# Patient Record
Sex: FEMALE | Race: WHITE | NOT HISPANIC OR LATINO | URBAN - METROPOLITAN AREA
[De-identification: names, ages, dates, MRNs, and addresses within clinical notes are randomized per-mention and may not be internally consistent; named-entity substitution may affect disease eponyms.]

---

## 2023-05-11 ENCOUNTER — TELEPHONE (OUTPATIENT)
Dept: PRIMARY CARE | Facility: CLINIC | Age: 73
End: 2023-05-11
Payer: MEDICARE

## 2023-05-11 NOTE — TELEPHONE ENCOUNTER
Called patient and left message that Dr. Acharya was looking at her schedule for tomorrow and she thinks that due to her slurred speech and difficulty speaking it is best the patient is evaluated ASAP at the emergency room rather than being seen in office.

## 2023-05-12 ENCOUNTER — OFFICE VISIT (OUTPATIENT)
Dept: PRIMARY CARE | Facility: CLINIC | Age: 73
End: 2023-05-12
Payer: MEDICARE

## 2023-05-12 VITALS
WEIGHT: 272.4 LBS | SYSTOLIC BLOOD PRESSURE: 142 MMHG | RESPIRATION RATE: 17 BRPM | HEART RATE: 89 BPM | OXYGEN SATURATION: 91 % | BODY MASS INDEX: 43.97 KG/M2 | DIASTOLIC BLOOD PRESSURE: 61 MMHG | TEMPERATURE: 97.7 F

## 2023-05-12 DIAGNOSIS — I51.89 DIASTOLIC DYSFUNCTION WITHOUT HEART FAILURE: ICD-10-CM

## 2023-05-12 DIAGNOSIS — I10 ESSENTIAL HYPERTENSION: Primary | ICD-10-CM

## 2023-05-12 DIAGNOSIS — E55.9 VITAMIN D DEFICIENCY: ICD-10-CM

## 2023-05-12 DIAGNOSIS — E66.01 MORBID OBESITY (MULTI): ICD-10-CM

## 2023-05-12 DIAGNOSIS — I47.11 INAPPROPRIATE SINUS NODE TACHYCARDIA (CMS-HCC): ICD-10-CM

## 2023-05-12 DIAGNOSIS — M15.9 PRIMARY OSTEOARTHRITIS INVOLVING MULTIPLE JOINTS: ICD-10-CM

## 2023-05-12 DIAGNOSIS — E11.9 CONTROLLED TYPE 2 DIABETES MELLITUS WITHOUT COMPLICATION, WITHOUT LONG-TERM CURRENT USE OF INSULIN (MULTI): ICD-10-CM

## 2023-05-12 DIAGNOSIS — Z11.59 ENCOUNTER FOR HEPATITIS C SCREENING TEST FOR LOW RISK PATIENT: ICD-10-CM

## 2023-05-12 DIAGNOSIS — E78.2 MIXED HYPERLIPIDEMIA: ICD-10-CM

## 2023-05-12 DIAGNOSIS — G89.4 CHRONIC PAIN SYNDROME: ICD-10-CM

## 2023-05-12 DIAGNOSIS — Z12.31 BREAST CANCER SCREENING BY MAMMOGRAM: ICD-10-CM

## 2023-05-12 DIAGNOSIS — F41.8 DEPRESSION WITH ANXIETY: ICD-10-CM

## 2023-05-12 DIAGNOSIS — Z86.718 PERSONAL HISTORY OF OTHER VENOUS THROMBOSIS AND EMBOLISM: ICD-10-CM

## 2023-05-12 DIAGNOSIS — J45.20 MILD INTERMITTENT ASTHMA WITHOUT COMPLICATION (HHS-HCC): ICD-10-CM

## 2023-05-12 DIAGNOSIS — R00.0 TACHYCARDIA: ICD-10-CM

## 2023-05-12 PROBLEM — M19.90 OSTEOARTHRITIS: Status: ACTIVE | Noted: 2023-05-12

## 2023-05-12 PROBLEM — R32 URINARY INCONTINENCE: Status: RESOLVED | Noted: 2023-05-12 | Resolved: 2023-05-12

## 2023-05-12 PROBLEM — M54.50 LOW BACK PAIN: Status: ACTIVE | Noted: 2023-05-12

## 2023-05-12 PROBLEM — J45.909 ASTHMA (HHS-HCC): Status: ACTIVE | Noted: 2023-05-12

## 2023-05-12 PROBLEM — H25.11 AGE-RELATED NUCLEAR CATARACT OF RIGHT EYE: Status: RESOLVED | Noted: 2019-08-05 | Resolved: 2023-05-12

## 2023-05-12 PROBLEM — G62.9 PERIPHERAL NEUROPATHY: Status: ACTIVE | Noted: 2023-05-12

## 2023-05-12 PROBLEM — L50.3 DERMATOGRAPHISM: Status: ACTIVE | Noted: 2017-04-27

## 2023-05-12 PROBLEM — E78.5 HYPERLIPIDEMIA: Status: ACTIVE | Noted: 2017-04-27

## 2023-05-12 PROBLEM — M25.361 KNEE INSTABILITY, RIGHT: Status: ACTIVE | Noted: 2023-05-12

## 2023-05-12 PROBLEM — R32 URINARY INCONTINENCE: Status: ACTIVE | Noted: 2023-05-12

## 2023-05-12 PROCEDURE — 4010F ACE/ARB THERAPY RXD/TAKEN: CPT | Performed by: FAMILY MEDICINE

## 2023-05-12 PROCEDURE — 3078F DIAST BP <80 MM HG: CPT | Performed by: FAMILY MEDICINE

## 2023-05-12 PROCEDURE — 99204 OFFICE O/P NEW MOD 45 MIN: CPT | Performed by: FAMILY MEDICINE

## 2023-05-12 PROCEDURE — 1159F MED LIST DOCD IN RCRD: CPT | Performed by: FAMILY MEDICINE

## 2023-05-12 PROCEDURE — 1170F FXNL STATUS ASSESSED: CPT | Performed by: FAMILY MEDICINE

## 2023-05-12 PROCEDURE — 3077F SYST BP >= 140 MM HG: CPT | Performed by: FAMILY MEDICINE

## 2023-05-12 RX ORDER — MONTELUKAST SODIUM 10 MG/1
10 TABLET ORAL DAILY
COMMUNITY

## 2023-05-12 RX ORDER — APIXABAN 5 MG/1
5 TABLET, FILM COATED ORAL DAILY
COMMUNITY
Start: 2023-05-03

## 2023-05-12 RX ORDER — ALBUTEROL SULFATE 0.83 MG/ML
2.5 SOLUTION RESPIRATORY (INHALATION) 4 TIMES DAILY
COMMUNITY
Start: 2023-03-22 | End: 2023-05-12 | Stop reason: ALTCHOICE

## 2023-05-12 RX ORDER — ALBUTEROL SULFATE 90 UG/1
2 AEROSOL, METERED RESPIRATORY (INHALATION) EVERY 4 HOURS PRN
COMMUNITY

## 2023-05-12 RX ORDER — DULOXETIN HYDROCHLORIDE 60 MG/1
60 CAPSULE, DELAYED RELEASE ORAL DAILY
COMMUNITY
Start: 2023-05-01

## 2023-05-12 RX ORDER — METOPROLOL SUCCINATE 25 MG/1
25 TABLET, EXTENDED RELEASE ORAL DAILY
COMMUNITY
Start: 2023-03-09

## 2023-05-12 RX ORDER — OXYBUTYNIN CHLORIDE 10 MG/1
TABLET, EXTENDED RELEASE ORAL
COMMUNITY
Start: 2023-03-26

## 2023-05-12 RX ORDER — TRAMADOL HYDROCHLORIDE 50 MG/1
50 TABLET ORAL 2 TIMES DAILY
COMMUNITY
Start: 2023-05-04

## 2023-05-12 RX ORDER — ERGOCALCIFEROL 1.25 MG/1
1.25 CAPSULE ORAL
COMMUNITY
Start: 2023-03-19

## 2023-05-12 RX ORDER — LOSARTAN POTASSIUM 25 MG/1
25 TABLET ORAL 2 TIMES DAILY
COMMUNITY
Start: 2023-05-01

## 2023-05-12 RX ORDER — CETIRIZINE HYDROCHLORIDE 10 MG/1
10 TABLET ORAL
COMMUNITY
Start: 2017-09-06

## 2023-05-12 ASSESSMENT — ENCOUNTER SYMPTOMS
NERVOUS/ANXIOUS: 0
TROUBLE SWALLOWING: 0
COUGH: 0
UNEXPECTED WEIGHT CHANGE: 0
FATIGUE: 0
MYALGIAS: 0
CONSTIPATION: 0
DIZZINESS: 0
DIARRHEA: 0
APPETITE CHANGE: 0
POLYDIPSIA: 0
POLYPHAGIA: 0
PALPITATIONS: 0
SHORTNESS OF BREATH: 0
LIGHT-HEADEDNESS: 0
ARTHRALGIAS: 1
NAUSEA: 0
BRUISES/BLEEDS EASILY: 0
LOSS OF SENSATION IN FEET: 1
DEPRESSION: 1
HEADACHES: 0
OCCASIONAL FEELINGS OF UNSTEADINESS: 1
DYSPHORIC MOOD: 0

## 2023-05-12 ASSESSMENT — ACTIVITIES OF DAILY LIVING (ADL)
JUDGMENT_ADEQUATE_SAFELY_COMPLETE_DAILY_ACTIVITIES: YES
DRESSING YOURSELF: NEEDS ASSISTANCE
HEARING - LEFT EAR: DIFFICULTY WITH NOISE
ADEQUATE_TO_COMPLETE_ADL: YES
ASSISTIVE_DEVICE: WALKER
PATIENT'S MEMORY ADEQUATE TO SAFELY COMPLETE DAILY ACTIVITIES?: YES
BATHING: NEEDS ASSISTANCE
WALKS IN HOME: DEPENDENT
GROOMING: NEEDS ASSISTANCE
FEEDING YOURSELF: INDEPENDENT
HEARING - RIGHT EAR: DIFFICULTY WITH NOISE
TOILETING: INDEPENDENT

## 2023-05-12 ASSESSMENT — PATIENT HEALTH QUESTIONNAIRE - PHQ9
8. MOVING OR SPEAKING SO SLOWLY THAT OTHER PEOPLE COULD HAVE NOTICED. OR THE OPPOSITE, BEING SO FIGETY OR RESTLESS THAT YOU HAVE BEEN MOVING AROUND A LOT MORE THAN USUAL: NEARLY EVERY DAY
SUM OF ALL RESPONSES TO PHQ9 QUESTIONS 1 AND 2: 3
1. LITTLE INTEREST OR PLEASURE IN DOING THINGS: NOT AT ALL
4. FEELING TIRED OR HAVING LITTLE ENERGY: NOT AT ALL
6. FEELING BAD ABOUT YOURSELF - OR THAT YOU ARE A FAILURE OR HAVE LET YOURSELF OR YOUR FAMILY DOWN: NOT AT ALL
3. TROUBLE FALLING OR STAYING ASLEEP OR SLEEPING TOO MUCH: NEARLY EVERY DAY
5. POOR APPETITE OR OVEREATING: NOT AT ALL
SUM OF ALL RESPONSES TO PHQ QUESTIONS 1-9: 12
9. THOUGHTS THAT YOU WOULD BE BETTER OFF DEAD, OR OF HURTING YOURSELF: NOT AT ALL
10. IF YOU CHECKED OFF ANY PROBLEMS, HOW DIFFICULT HAVE THESE PROBLEMS MADE IT FOR YOU TO DO YOUR WORK, TAKE CARE OF THINGS AT HOME, OR GET ALONG WITH OTHER PEOPLE: NOT DIFFICULT AT ALL
2. FEELING DOWN, DEPRESSED OR HOPELESS: NEARLY EVERY DAY
7. TROUBLE CONCENTRATING ON THINGS, SUCH AS READING THE NEWSPAPER OR WATCHING TELEVISION: NEARLY EVERY DAY

## 2023-05-12 ASSESSMENT — COLUMBIA-SUICIDE SEVERITY RATING SCALE - C-SSRS
1. IN THE PAST MONTH, HAVE YOU WISHED YOU WERE DEAD OR WISHED YOU COULD GO TO SLEEP AND NOT WAKE UP?: NO
2. HAVE YOU ACTUALLY HAD ANY THOUGHTS OF KILLING YOURSELF?: NO
6. HAVE YOU EVER DONE ANYTHING, STARTED TO DO ANYTHING, OR PREPARED TO DO ANYTHING TO END YOUR LIFE?: NO

## 2023-05-12 NOTE — ASSESSMENT & PLAN NOTE
DVT was around one year ago. RLE. Below knee per patient. NO records from [facility] in Care Everywhere. Expalined that usually do not continue Eliquis indefinitely. Going back to see Dr. Mazariegos so will let her decide when to stop.

## 2023-05-12 NOTE — ASSESSMENT & PLAN NOTE
Is currently not on controller. Denies exacerbation but gets winded when walks fast. She is on Singulair and prn albuterol.

## 2023-05-12 NOTE — ASSESSMENT & PLAN NOTE
Obesity contributes. She is losing weight. On chronic Tramadol. She has been getting from Dr. Tripp in [place].

## 2023-05-12 NOTE — PATIENT INSTRUCTIONS
You have referral to Dr. Mazariegos  You have referral to [facility] in [place].     Have labs drawn for chronic conditions and follow up in 2 weeks.   Bring your pill bottles with you to visit.

## 2023-05-12 NOTE — PROGRESS NOTES
Subjective   Patient ID: Mary Dao is a 73 y.o. female who presents for [facility] (Patient states that her family thinks that she may have had a stroke do to slurring of speech uneven smile. ).    New patient    Came in to see if she had a stroke. Her smile was off. Right side is droopy. She has no clue. She has had a lisp and not sure if started when got new teeth last fall.   Not sure how long her smile has been off. Asked her son about it 2 yrs ago. Her only difficulty speaking is a lisp for the last couple years. She has no difficulty finding words or saying them.     Diastolic dysfunction - Dr. Mazariegos - has not seen her in 3 yrs. She has been busy caring for [facility] with MS. Florian to see her again.     HTN - takes medication for high blood pressure. No heart attack. No chest pain. Only gets chest pain when gets winded from walking too fast. This has been going on for 10 yrs. IS not different than in the past. She thinks was checked out by Dr. Mazariegos.     Asthma - only on Albuterol. Gets symptoms when gets sick per patient. Also if pollen high. Is on Singulair 10 mg daily. On Zyrtec 10 mg daily    Hyperlipidemia - she lost 110 lbs and has not been on medication. Lost the weight overr the past 4 yrs.     Tachycardia has resolved.     Incontinence is better.     She takes Tramadol for knee pain, hip pain, ankle pain for her arthritis. She went to arthritis doctor and told her is degenerative. Tramadol is prescribed by Dr. Tripp at [facility]. She used them while with her  in assisted living. Did go to pain management clinic in [place].     Vitamin D deficiency - on Vitamin D 2000 units daily.     Allergies - On Zyrtec daily. She Is going to resume Flonase.     DM2 - A1Cs in 2020 were in diabetes range. She has had sugars checked and are due in August. Last A1C was 5.9 per patient.. She stopped all her sweets to lose the weight.  A1 Cs been 6.8 then 6.5 then 5.9. The 6.5 was drawn at  [place]. Not sure what lab it was sent to.     Had DVT in calf 1 year ago. No clots in lungs. Clot was diagnosed at [facility].                        Current Outpatient Medications:     albuterol 90 mcg/actuation inhaler, Inhale 2 puffs every 4 hours if needed., Disp: , Rfl:     cetirizine (ZyrTEC) 10 mg tablet, Take 1 tablet (10 mg) by mouth., Disp: , Rfl:     DULoxetine (Cymbalta) 60 mg DR capsule, Take 1 capsule (60 mg) by mouth once daily., Disp: , Rfl:     Eliquis 5 mg tablet, Take 1 tablet (5 mg) by mouth once daily., Disp: , Rfl:     ergocalciferol (Vitamin D-2) 1.25 MG (67289 UT) capsule, Take 1 capsule (1,250 mcg) by mouth 1 (one) time per week., Disp: , Rfl:     losartan (Cozaar) 25 mg tablet, Take 1 tablet (25 mg) by mouth 2 times a day., Disp: , Rfl:     metoprolol succinate XL (Toprol-XL) 25 mg 24 hr tablet, Take 1 tablet (25 mg) by mouth once daily., Disp: , Rfl:     montelukast (Singulair) 10 mg tablet, Take 1 tablet (10 mg) by mouth once daily., Disp: , Rfl:     oxybutynin XL (Ditropan-XL) 10 mg 24 hr tablet, TAKE 1 TABLET BY MOUTH EVERY DAY FOR STRESS INCONTINENCE, Disp: , Rfl:     traMADol (Ultram) 50 mg tablet, Take 1 tablet (50 mg) by mouth 2 times a day., Disp: , Rfl:     Patient Active Problem List   Diagnosis    Personal history of other venous thrombosis and embolism    Peripheral neuropathy    Low back pain    Knee instability, right    Inappropriate sinus node tachycardia    Hyperlipidemia    Essential hypertension    Diastolic dysfunction without heart failure    Dermatographism    Depression with anxiety    Asthma    Morbid obesity (CMS/HCC)    Primary osteoarthritis involving multiple joints    Sensorineural hearing loss, bilateral         Review of Systems   Constitutional:  Negative for appetite change, fatigue and unexpected weight change.   HENT:  Negative for trouble swallowing.    Eyes:  Negative for visual disturbance.   Respiratory:  Negative for cough and shortness of breath.     Cardiovascular:  Negative for chest pain, palpitations and leg swelling.   Gastrointestinal:  Negative for constipation, diarrhea and nausea.   Endocrine: Negative for cold intolerance, heat intolerance, polydipsia, polyphagia and polyuria.   Musculoskeletal:  Positive for arthralgias. Negative for myalgias.   Skin:  Negative for rash.   Neurological:  Negative for dizziness, light-headedness and headaches.   Hematological:  Does not bruise/bleed easily.   Psychiatric/Behavioral:  Negative for dysphoric mood. The patient is not nervous/anxious.           not long ago. She has not been feeling badly outside of normal grief. Depression has been controlled with Duloxetine.        Objective   /61 (BP Location: Left arm, Patient Position: Sitting, BP Cuff Size: Large adult)   Pulse 89   Temp 36.5 °C (97.7 °F)   Resp 17   Wt 124 kg (272 lb 6.4 oz)   SpO2 91%   BMI 43.97 kg/m²     Physical Exam  Vitals reviewed.   Constitutional:       General: She is not in acute distress.     Appearance: She is not ill-appearing.   HENT:      Head: Normocephalic and atraumatic.      Nose: Nose normal.      Mouth/Throat:      Mouth: Mucous membranes are dry.      Pharynx: Oropharynx is clear.      Comments: Mild lisp. NO dysarthria.   Eyes:      Extraocular Movements: Extraocular movements intact.      Conjunctiva/sclera: Conjunctivae normal.      Pupils: Pupils are equal, round, and reactive to light.   Neck:      Vascular: No carotid bruit.      Comments: No thryomegaly. No bruit.   Cardiovascular:      Rate and Rhythm: Normal rate and regular rhythm.      Pulses: Normal pulses.      Heart sounds: No murmur heard.  Pulmonary:      Effort: Pulmonary effort is normal.      Breath sounds: Normal breath sounds.   Musculoskeletal:         General: Normal range of motion.      Cervical back: Normal range of motion and neck supple.      Right lower leg: No edema.      Left lower leg: No edema.   Skin:     General: Skin  is warm and dry.      Findings: No rash.   Neurological:      General: No focal deficit present.      Mental Status: She is alert and oriented to person, place, and time. Mental status is at baseline.      Cranial Nerves: No cranial nerve deficit.      Sensory: No sensory deficit.      Motor: No weakness.      Coordination: Coordination normal.      Comments: Hard of hearing   Psychiatric:         Mood and Affect: Mood normal.         Assessment/Plan   Problem List Items Addressed This Visit          Respiratory    Asthma     Is currently not on controller. Denies exacerbation but gets winded when walks fast. She is on Singulair and prn albuterol.             Circulatory    Inappropriate sinus node tachycardia     Controlled on Metoprolol. Needs to get back in to see Dr. Mazariegos.          Relevant Medications    metoprolol succinate XL (Toprol-XL) 25 mg 24 hr tablet    Essential hypertension - Primary     /61 currently. Discussed goal less than 140. She is on Losartan currently. Follow up in 2 months, lab prior.          Diastolic dysfunction without heart failure       Musculoskeletal    Primary osteoarthritis involving multiple joints     Obesity contributes. She is losing weight. On chronic Tramadol. She has been getting from Dr. Tripp in [place].             Endocrine/Metabolic    Morbid obesity (CMS/HCC)     Has lost a lot of weight and working on diet. Little exercise.             Other    Personal history of other venous thrombosis and embolism     DVT was around one year ago. RLE. Below knee per patient. NO records from [facility] in Care Everywhere. Expalined that usually do not continue Eliquis indefinitely. Going back to see Dr. Mazariegos so will let her decide when to stop.          Hyperlipidemia    Depression with anxiety     Stable on Duloxetine.  diet 2 weeks ago of MS.           Other Visit Diagnoses       Tachycardia                  Assessment, plans, tests, and follow up  discussed with patient and patient verbalized understanding. Mary was given an opportunity to ask questions and  any concerns were addressed including but not limited to need for labs and follow up Need to go to Pain management for pain meds. .

## 2023-05-12 NOTE — ASSESSMENT & PLAN NOTE
/61 currently. Discussed goal less than 140. She is on Losartan currently. Follow up in 2 months, lab prior.

## 2023-05-12 NOTE — ASSESSMENT & PLAN NOTE
Patient referred to pain clinic in [place]. States she was seen there remotely. She agreed to go there.

## 2024-09-24 DIAGNOSIS — E55.9 VITAMIN D DEFICIENCY, UNSPECIFIED: ICD-10-CM

## 2024-09-24 DIAGNOSIS — E78.5 HYPERLIPIDEMIA, UNSPECIFIED: ICD-10-CM

## 2024-09-24 DIAGNOSIS — E11.9 TYPE 2 DIABETES MELLITUS WITHOUT COMPLICATIONS (MULTI): Primary | ICD-10-CM

## 2024-09-24 DIAGNOSIS — I10 ESSENTIAL (PRIMARY) HYPERTENSION: ICD-10-CM

## 2024-09-24 DIAGNOSIS — E46 UNSPECIFIED PROTEIN-CALORIE MALNUTRITION (MULTI): ICD-10-CM

## 2024-10-28 RX ORDER — SEMAGLUTIDE 0.68 MG/ML
0.25 INJECTION, SOLUTION SUBCUTANEOUS WEEKLY
COMMUNITY
Start: 2024-09-20

## 2024-10-28 RX ORDER — LOSARTAN POTASSIUM 50 MG/1
1 TABLET ORAL 2 TIMES DAILY
COMMUNITY
Start: 2024-07-16

## 2024-10-28 RX ORDER — FAMOTIDINE 20 MG/1
20 TABLET, FILM COATED ORAL 2 TIMES DAILY
COMMUNITY
Start: 2024-04-22

## 2024-10-28 RX ORDER — BACLOFEN 10 MG/1
10 TABLET ORAL DAILY
COMMUNITY
Start: 2024-07-01

## 2024-11-13 ENCOUNTER — OFFICE VISIT (OUTPATIENT)
Dept: CARDIOLOGY | Facility: HOSPITAL | Age: 74
End: 2024-11-13
Payer: MEDICARE

## 2024-11-13 VITALS
BODY MASS INDEX: 44.87 KG/M2 | DIASTOLIC BLOOD PRESSURE: 60 MMHG | HEIGHT: 66 IN | HEART RATE: 77 BPM | WEIGHT: 279.2 LBS | SYSTOLIC BLOOD PRESSURE: 128 MMHG

## 2024-11-13 DIAGNOSIS — E78.2 MIXED HYPERLIPIDEMIA: ICD-10-CM

## 2024-11-13 DIAGNOSIS — I10 ESSENTIAL HYPERTENSION: Primary | ICD-10-CM

## 2024-11-13 DIAGNOSIS — R06.02 SHORTNESS OF BREATH: ICD-10-CM

## 2024-11-13 PROCEDURE — 3008F BODY MASS INDEX DOCD: CPT | Performed by: INTERNAL MEDICINE

## 2024-11-13 PROCEDURE — 99204 OFFICE O/P NEW MOD 45 MIN: CPT | Performed by: INTERNAL MEDICINE

## 2024-11-13 PROCEDURE — 3074F SYST BP LT 130 MM HG: CPT | Performed by: INTERNAL MEDICINE

## 2024-11-13 PROCEDURE — 3078F DIAST BP <80 MM HG: CPT | Performed by: INTERNAL MEDICINE

## 2024-11-13 PROCEDURE — 4010F ACE/ARB THERAPY RXD/TAKEN: CPT | Performed by: INTERNAL MEDICINE

## 2024-11-13 PROCEDURE — 93005 ELECTROCARDIOGRAM TRACING: CPT | Performed by: INTERNAL MEDICINE

## 2024-11-13 PROCEDURE — 1159F MED LIST DOCD IN RCRD: CPT | Performed by: INTERNAL MEDICINE

## 2024-11-13 PROCEDURE — 93010 ELECTROCARDIOGRAM REPORT: CPT | Performed by: INTERNAL MEDICINE

## 2024-11-13 PROCEDURE — 1160F RVW MEDS BY RX/DR IN RCRD: CPT | Performed by: INTERNAL MEDICINE

## 2024-11-13 PROCEDURE — 1036F TOBACCO NON-USER: CPT | Performed by: INTERNAL MEDICINE

## 2024-11-13 PROCEDURE — 99214 OFFICE O/P EST MOD 30 MIN: CPT | Performed by: INTERNAL MEDICINE

## 2024-11-13 RX ORDER — ATROPINE SULFATE 0.1 MG/ML
.25-5 INJECTION INTRAVENOUS ONCE AS NEEDED
OUTPATIENT
Start: 2024-11-13

## 2024-11-13 RX ORDER — BUDESONIDE AND FORMOTEROL FUMARATE DIHYDRATE 160; 4.5 UG/1; UG/1
2 AEROSOL RESPIRATORY (INHALATION)
COMMUNITY

## 2024-11-13 RX ORDER — DOBUTAMINE HYDROCHLORIDE 100 MG/100ML
5-40 INJECTION INTRAVENOUS CONTINUOUS
OUTPATIENT
Start: 2024-11-13

## 2024-11-13 ASSESSMENT — ENCOUNTER SYMPTOMS
OCCASIONAL FEELINGS OF UNSTEADINESS: 0
LOSS OF SENSATION IN FEET: 0
DEPRESSION: 1

## 2024-11-13 NOTE — PROGRESS NOTES
Chief Complaint:   New Patient Visit (est)     History Of Present Illness:    Mary Dao is a 74 y.o. female presenting to [facility].  He has a past medical history of hypertension, hyperlipidemia, diabetes, family history of coronary artery disease in mother at 72.    She is coming in for evaluation of shortness of breath.  She states that since she saw me last in 2020 she suffered from a DVT but did not have PE.  She is currently on Eliquis 2.5 twice a day.  She also has asthma that is well-controlled with current medications.  She had a neck surgery few months ago.  She states she is getting out of breath easily with minimal activities.  She does not have any symptoms at rest.  No chest pain.  She does not have ankle swelling, orthopnea or PND.  She is trying to gradually increase her exercise capacity.  She has lost 5 pounds since she started Ozempic.         See past medical history below-we diagnosed her with inappropriate sinus tachycardia.  She gets physical therapy because of arthritis and aches and pains. The physical therapist noted that her heart rate is at 117 bpm at rest and goes up 144 bpm sometimes. During exercise she sometimes lightheaded and requested further cardiac evaluation.  The symptoms improved after her blood pressure medicines were adjusted and blood pressure got under control.  However     Patient states that her symptoms are going on for last 2 years. Her heart rate is always high. She was evaluated at [facility] because of palpitations 2 years ago and was told that she has a stiff heart. She was advised to take no more than 8 ounces of fluid per day.     I reviewed echocardiogram done at [facility] on July 04, 2018 showing normal LV function and stage I diastolic dysfunction. She recently had a Holter monitor for 24 hours showing predominantly sinus rhythm with average heart rate of 10 4 bpm and maximum heart rate of 1 47 bpm, few PVCs seen.)            Last Recorded  "Vitals:  Vitals:    11/13/24 1203   BP: 128/60   BP Location: Left arm   Pulse: 77   Weight: 127 kg (279 lb 3.2 oz)   Height: 1.676 m (5' 6\")       Past Medical History:  She has a past medical history of Age-related nuclear cataract of right eye (08/05/2019), Other specified anxiety disorders (07/13/2018), Personal history of other diseases of the circulatory system (11/10/2017), Personal history of other diseases of the respiratory system (11/10/2017), Personal history of other specified conditions, Unspecified asthma, uncomplicated ([facility]) (12/11/2019), and Urinary incontinence (05/12/2023).    Past Surgical History:  She has no past surgical history on file.      Social History:  She reports that she has never smoked. She has never used smokeless tobacco. She reports that she does not drink alcohol and does not use drugs.    Family History:  No family history on file.     Allergies:  Codeine, Dextromethorphan, Fluticasone propion-salmeterol, Lidocaine, Morphine, Other, Penicillins, and Sulfa (sulfonamide antibiotics)    Outpatient Medications:  Current Outpatient Medications   Medication Instructions    albuterol 90 mcg/actuation inhaler 2 puffs, Every 4 hours PRN    baclofen (LIORESAL) 10 mg, Daily    budesonide-formoteroL (Symbicort) 160-4.5 mcg/actuation inhaler 2 puffs, inhalation, 2 times daily RT, Rinse mouth with water after use to reduce aftertaste and incidence of candidiasis. Do not swallow.    cetirizine (ZYRTEC) 10 mg    DULoxetine (CYMBALTA) 60 mg, Daily    Eliquis 2.5 mg, 2 times daily    ergocalciferol (VITAMIN D-2) 1.25 mg, Once Weekly    famotidine (PEPCID) 20 mg, 2 times daily    losartan (Cozaar) 50 mg tablet 1 tablet, oral, 2 times daily    losartan (COZAAR) 25 mg, 2 times daily    metoprolol succinate XL (TOPROL-XL) 25 mg, Daily    montelukast (SINGULAIR) 10 mg, Daily    oxybutynin XL (Ditropan-XL) 10 mg 24 hr tablet TAKE 1 TABLET BY MOUTH EVERY DAY FOR STRESS INCONTINENCE    Ozempic 0.25 " mg, Weekly    traMADol (ULTRAM) 50 mg, 2 times daily       Physical Exam:  Physical Exam  Vitals reviewed.   Constitutional:       Appearance: Normal appearance.   Neck:      Vascular: No carotid bruit or JVD.   Cardiovascular:      Rate and Rhythm: Normal rate and regular rhythm.      Pulses: Normal pulses.      Heart sounds: Normal heart sounds, S1 normal and S2 normal.   Pulmonary:      Effort: Pulmonary effort is normal. No respiratory distress.      Breath sounds: No wheezing or rales.   Abdominal:      General: Abdomen is flat.      Palpations: Abdomen is soft.   Musculoskeletal:      Right lower leg: No edema.      Left lower leg: No edema.   Skin:     General: Skin is warm.   Neurological:      Mental Status: She is alert and oriented to person, place, and time. Mental status is at baseline.   Psychiatric:         Mood and Affect: Mood normal.         Behavior: Behavior normal.           Last Labs:  CBC -  Lab Results   Component Value Date    WBC 8.6 07/15/2020    HGB 12.8 07/15/2020    HCT 39.5 07/15/2020    MCV 83 07/15/2020     07/15/2020       CMP -  Lab Results   Component Value Date    CALCIUM 8.7 07/29/2020    PROT 6.0 (L) 07/15/2020    ALBUMIN 3.5 07/15/2020    AST 15 07/15/2020    ALT 17 07/15/2020    ALKPHOS 86 07/15/2020    BILITOT 0.8 07/15/2020       LIPID PANEL -   Lab Results   Component Value Date    CHOL 175 07/15/2020    TRIG 112 07/15/2020    HDL 49.7 07/15/2020    CHHDL 3.5 07/15/2020    LDLF 103 (H) 07/15/2020    VLDL 22 07/15/2020       RENAL FUNCTION PANEL -   Lab Results   Component Value Date    GLUCOSE 158 (H) 07/29/2020     07/29/2020    K 4.0 07/29/2020     07/29/2020    CO2 29 07/29/2020    ANIONGAP 9 (L) 07/29/2020    BUN 10 07/29/2020    CREATININE 0.67 07/29/2020    CALCIUM 8.7 07/29/2020    ALBUMIN 3.5 07/15/2020        Lab Results   Component Value Date    BNP 20 11/13/2019    HGBA1C 6.4 (H) 04/03/2024       Last Cardiology Tests:  ECG:  No results found  "for this or any previous visit from the past 1095 days.      Echo:  No results found for this or any previous visit from the past 1095 days.      Ejection Fractions:  No results found for: \"EF\"    Cath:  No results found for this or any previous visit from the past 1095 days.      Stress Test:  No results found for this or any previous visit from the past 1095 days.      Cardiac Imaging:  No results found for this or any previous visit from the past 1095 days.          Assessment/Plan     In summary Ms. Dao is a 74-year-old lady with inappropriate sinus tachycardia.     1- IST- She has palpitations with heart rate at rest sometimes up to 144 bpm. Holter monitor did not show any arrhythmia.  She can continue physical therapy.  She is currently on a beta-blocker.    2- HTN- she has well-controlled hypertension.    3-shortness of breath-differential diagnosis deconditioning, versus cardiac.  Asthma seems well-controlled at this time and patient does not have history of PE she is chronically anticoagulated.    Prior history of diastolic dysfunction on echocardiogram.  Will repeat echo and arrange for a pharmacological stress MPI.  Patient is unable to exercise uses a walker to ambulate.    She has multiple cardiovascular risk factors including family history, history of diabetes.    4-hyperlipidemia associate with diabetes-would benefit from a statin we discussed but she declines at this time citing adverse reaction in father.       Shena Mazariegos MD  "

## 2024-11-27 ENCOUNTER — APPOINTMENT (OUTPATIENT)
Dept: CARDIOLOGY | Facility: HOSPITAL | Age: 74
End: 2024-11-27
Payer: MEDICARE

## 2024-12-13 ENCOUNTER — APPOINTMENT (OUTPATIENT)
Dept: CARDIOLOGY | Facility: HOSPITAL | Age: 74
End: 2024-12-13
Payer: MEDICARE

## 2024-12-16 ENCOUNTER — HOSPITAL ENCOUNTER (OUTPATIENT)
Dept: CARDIOLOGY | Facility: HOSPITAL | Age: 74
Discharge: HOME | End: 2024-12-16
Payer: MEDICARE

## 2024-12-16 DIAGNOSIS — I10 ESSENTIAL HYPERTENSION: ICD-10-CM

## 2024-12-16 DIAGNOSIS — R06.02 SHORTNESS OF BREATH: ICD-10-CM

## 2024-12-16 PROCEDURE — C8929 TTE W OR WO FOL WCON,DOPPLER: HCPCS | Mod: 59

## 2024-12-16 PROCEDURE — 93350 STRESS TTE ONLY: CPT | Performed by: INTERNAL MEDICINE

## 2024-12-16 PROCEDURE — 93016 CV STRESS TEST SUPVJ ONLY: CPT | Performed by: INTERNAL MEDICINE

## 2024-12-16 PROCEDURE — 93306 TTE W/DOPPLER COMPLETE: CPT | Performed by: INTERNAL MEDICINE

## 2024-12-16 PROCEDURE — 2500000004 HC RX 250 GENERAL PHARMACY W/ HCPCS (ALT 636 FOR OP/ED): Performed by: INTERNAL MEDICINE

## 2024-12-16 PROCEDURE — 93017 CV STRESS TEST TRACING ONLY: CPT

## 2024-12-16 PROCEDURE — 93018 CV STRESS TEST I&R ONLY: CPT | Performed by: INTERNAL MEDICINE

## 2024-12-16 RX ORDER — DOBUTAMINE HYDROCHLORIDE 100 MG/100ML
5-40 INJECTION INTRAVENOUS CONTINUOUS
Status: DISCONTINUED | OUTPATIENT
Start: 2024-12-16 | End: 2024-12-17 | Stop reason: HOSPADM

## 2024-12-16 RX ORDER — ATROPINE SULFATE 0.1 MG/ML
.25-5 INJECTION INTRAVENOUS ONCE AS NEEDED
Status: DISCONTINUED | OUTPATIENT
Start: 2024-12-16 | End: 2024-12-17 | Stop reason: HOSPADM

## 2024-12-16 RX ORDER — METOPROLOL TARTRATE 1 MG/ML
3 INJECTION, SOLUTION INTRAVENOUS ONCE
Status: COMPLETED | OUTPATIENT
Start: 2024-12-16 | End: 2024-12-16

## 2024-12-17 LAB
AORTIC VALVE MEAN GRADIENT: 8 MMHG
AORTIC VALVE PEAK VELOCITY: 1.98 M/S
AV PEAK GRADIENT: 16 MMHG
AVA (PEAK VEL): 1.89 CM2
AVA (VTI): 1.88 CM2
EJECTION FRACTION APICAL 4 CHAMBER: 56.8
EJECTION FRACTION: 59 %
LEFT ATRIUM VOLUME AREA LENGTH INDEX BSA: 10.6 ML/M2
LEFT VENTRICLE INTERNAL DIMENSION DIASTOLE: 4.46 CM (ref 3.5–6)
LEFT VENTRICULAR OUTFLOW TRACT DIAMETER: 2.1 CM
LV EJECTION FRACTION BIPLANE: 59 %
MITRAL VALVE E/A RATIO: 0.69
RIGHT VENTRICLE FREE WALL PEAK S': 12.6 CM/S
RIGHT VENTRICLE PEAK SYSTOLIC PRESSURE: 32.2 MMHG
TRICUSPID ANNULAR PLANE SYSTOLIC EXCURSION: 2.1 CM

## 2024-12-18 ENCOUNTER — TELEPHONE (OUTPATIENT)
Dept: CARDIOLOGY | Facility: HOSPITAL | Age: 74
End: 2024-12-18
Payer: COMMERCIAL

## 2024-12-19 ENCOUNTER — TELEPHONE (OUTPATIENT)
Dept: CARDIOLOGY | Facility: HOSPITAL | Age: 74
End: 2024-12-19

## 2024-12-19 NOTE — TELEPHONE ENCOUNTER
Called patient with non critical stress test and Echo results. Patient is okay to follow up at next appointment as there are no emergent concerns at this time. Left a voicemail for her to call the office, or check her MyChart for results.

## 2024-12-21 ENCOUNTER — APPOINTMENT (OUTPATIENT)
Dept: RADIOLOGY | Facility: HOSPITAL | Age: 74
End: 2024-12-21
Payer: MEDICARE

## 2024-12-21 ENCOUNTER — HOSPITAL ENCOUNTER (INPATIENT)
Facility: HOSPITAL | Age: 74
End: 2024-12-21
Attending: EMERGENCY MEDICINE | Admitting: STUDENT IN AN ORGANIZED HEALTH CARE EDUCATION/TRAINING PROGRAM
Payer: MEDICARE

## 2024-12-21 ENCOUNTER — APPOINTMENT (OUTPATIENT)
Dept: CARDIOLOGY | Facility: HOSPITAL | Age: 74
End: 2024-12-21
Payer: MEDICARE

## 2024-12-21 DIAGNOSIS — E83.42 HYPOMAGNESEMIA: ICD-10-CM

## 2024-12-21 DIAGNOSIS — N30.90 CYSTITIS: Primary | ICD-10-CM

## 2024-12-21 DIAGNOSIS — R79.89 ELEVATED TROPONIN: ICD-10-CM

## 2024-12-21 DIAGNOSIS — N17.9 ACUTE KIDNEY INJURY (CMS-HCC): ICD-10-CM

## 2024-12-21 PROBLEM — M19.90 OSTEOARTHRITIS: Status: ACTIVE | Noted: 2024-12-21

## 2024-12-21 PROBLEM — M25.559 ARTHRALGIA OF HIP: Status: ACTIVE | Noted: 2024-12-21

## 2024-12-21 LAB
ALBUMIN SERPL BCP-MCNC: 3.6 G/DL (ref 3.4–5)
ALP SERPL-CCNC: 66 U/L (ref 33–136)
ALT SERPL W P-5'-P-CCNC: 26 U/L (ref 7–45)
ANION GAP SERPL CALC-SCNC: 14 MMOL/L (ref 10–20)
APPEARANCE UR: ABNORMAL
AST SERPL W P-5'-P-CCNC: 35 U/L (ref 9–39)
BACTERIA #/AREA URNS AUTO: ABNORMAL /HPF
BASOPHILS # BLD AUTO: 0.02 X10*3/UL (ref 0–0.1)
BASOPHILS NFR BLD AUTO: 0.1 %
BILIRUB SERPL-MCNC: 2.3 MG/DL (ref 0–1.2)
BILIRUB UR STRIP.AUTO-MCNC: NEGATIVE MG/DL
BUN SERPL-MCNC: 28 MG/DL (ref 6–23)
CALCIUM SERPL-MCNC: 8.5 MG/DL (ref 8.6–10.3)
CARDIAC TROPONIN I PNL SERPL HS: 42 NG/L (ref 0–13)
CARDIAC TROPONIN I PNL SERPL HS: 55 NG/L (ref 0–13)
CHLORIDE SERPL-SCNC: 107 MMOL/L (ref 98–107)
CK SERPL-CCNC: 533 U/L (ref 0–215)
CO2 SERPL-SCNC: 22 MMOL/L (ref 21–32)
COLOR UR: ABNORMAL
CREAT SERPL-MCNC: 1.6 MG/DL (ref 0.5–1.05)
EGFRCR SERPLBLD CKD-EPI 2021: 34 ML/MIN/1.73M*2
EOSINOPHIL # BLD AUTO: 0.01 X10*3/UL (ref 0–0.4)
EOSINOPHIL NFR BLD AUTO: 0.1 %
ERYTHROCYTE [DISTWIDTH] IN BLOOD BY AUTOMATED COUNT: 13.7 % (ref 11.5–14.5)
FLUAV RNA RESP QL NAA+PROBE: NOT DETECTED
FLUBV RNA RESP QL NAA+PROBE: NOT DETECTED
GLUCOSE BLD MANUAL STRIP-MCNC: 158 MG/DL (ref 74–99)
GLUCOSE SERPL-MCNC: 173 MG/DL (ref 74–99)
GLUCOSE UR STRIP.AUTO-MCNC: NORMAL MG/DL
HCT VFR BLD AUTO: 36.3 % (ref 36–46)
HGB BLD-MCNC: 11.9 G/DL (ref 12–16)
IMM GRANULOCYTES # BLD AUTO: 0.06 X10*3/UL (ref 0–0.5)
IMM GRANULOCYTES NFR BLD AUTO: 0.4 % (ref 0–0.9)
KETONES UR STRIP.AUTO-MCNC: NEGATIVE MG/DL
LEUKOCYTE ESTERASE UR QL STRIP.AUTO: ABNORMAL
LIPASE SERPL-CCNC: 3 U/L (ref 9–82)
LYMPHOCYTES # BLD AUTO: 1.16 X10*3/UL (ref 0.8–3)
LYMPHOCYTES NFR BLD AUTO: 7.6 %
MAGNESIUM SERPL-MCNC: 1.49 MG/DL (ref 1.6–2.4)
MCH RBC QN AUTO: 27.2 PG (ref 26–34)
MCHC RBC AUTO-ENTMCNC: 32.8 G/DL (ref 32–36)
MCV RBC AUTO: 83 FL (ref 80–100)
MONOCYTES # BLD AUTO: 1.15 X10*3/UL (ref 0.05–0.8)
MONOCYTES NFR BLD AUTO: 7.6 %
MUCOUS THREADS #/AREA URNS AUTO: ABNORMAL /LPF
NEUTROPHILS # BLD AUTO: 12.77 X10*3/UL (ref 1.6–5.5)
NEUTROPHILS NFR BLD AUTO: 84.2 %
NITRITE UR QL STRIP.AUTO: ABNORMAL
NRBC BLD-RTO: 0 /100 WBCS (ref 0–0)
PH UR STRIP.AUTO: 5.5 [PH]
PLATELET # BLD AUTO: 260 X10*3/UL (ref 150–450)
POTASSIUM SERPL-SCNC: 4.1 MMOL/L (ref 3.5–5.3)
PROT SERPL-MCNC: 6.3 G/DL (ref 6.4–8.2)
PROT UR STRIP.AUTO-MCNC: ABNORMAL MG/DL
RBC # BLD AUTO: 4.37 X10*6/UL (ref 4–5.2)
RBC # UR STRIP.AUTO: ABNORMAL /UL
RBC #/AREA URNS AUTO: >20 /HPF
SARS-COV-2 RNA RESP QL NAA+PROBE: NOT DETECTED
SODIUM SERPL-SCNC: 139 MMOL/L (ref 136–145)
SP GR UR STRIP.AUTO: 1.02
SQUAMOUS #/AREA URNS AUTO: ABNORMAL /HPF
UROBILINOGEN UR STRIP.AUTO-MCNC: NORMAL MG/DL
WBC # BLD AUTO: 15.2 X10*3/UL (ref 4.4–11.3)
WBC #/AREA URNS AUTO: >50 /HPF

## 2024-12-21 PROCEDURE — 99285 EMERGENCY DEPT VISIT HI MDM: CPT | Mod: 25 | Performed by: EMERGENCY MEDICINE

## 2024-12-21 PROCEDURE — 93005 ELECTROCARDIOGRAM TRACING: CPT

## 2024-12-21 PROCEDURE — 73502 X-RAY EXAM HIP UNI 2-3 VIEWS: CPT | Mod: LEFT SIDE | Performed by: RADIOLOGY

## 2024-12-21 PROCEDURE — 99223 1ST HOSP IP/OBS HIGH 75: CPT | Performed by: STUDENT IN AN ORGANIZED HEALTH CARE EDUCATION/TRAINING PROGRAM

## 2024-12-21 PROCEDURE — 84484 ASSAY OF TROPONIN QUANT: CPT | Performed by: STUDENT IN AN ORGANIZED HEALTH CARE EDUCATION/TRAINING PROGRAM

## 2024-12-21 PROCEDURE — 2500000001 HC RX 250 WO HCPCS SELF ADMINISTERED DRUGS (ALT 637 FOR MEDICARE OP): Performed by: STUDENT IN AN ORGANIZED HEALTH CARE EDUCATION/TRAINING PROGRAM

## 2024-12-21 PROCEDURE — 99291 CRITICAL CARE FIRST HOUR: CPT | Performed by: EMERGENCY MEDICINE

## 2024-12-21 PROCEDURE — 87636 SARSCOV2 & INF A&B AMP PRB: CPT | Performed by: STUDENT IN AN ORGANIZED HEALTH CARE EDUCATION/TRAINING PROGRAM

## 2024-12-21 PROCEDURE — 80053 COMPREHEN METABOLIC PANEL: CPT | Performed by: STUDENT IN AN ORGANIZED HEALTH CARE EDUCATION/TRAINING PROGRAM

## 2024-12-21 PROCEDURE — 87040 BLOOD CULTURE FOR BACTERIA: CPT | Mod: PORLAB | Performed by: EMERGENCY MEDICINE

## 2024-12-21 PROCEDURE — 87086 URINE CULTURE/COLONY COUNT: CPT | Mod: PORLAB | Performed by: STUDENT IN AN ORGANIZED HEALTH CARE EDUCATION/TRAINING PROGRAM

## 2024-12-21 PROCEDURE — 70450 CT HEAD/BRAIN W/O DYE: CPT

## 2024-12-21 PROCEDURE — 83690 ASSAY OF LIPASE: CPT | Performed by: STUDENT IN AN ORGANIZED HEALTH CARE EDUCATION/TRAINING PROGRAM

## 2024-12-21 PROCEDURE — 82947 ASSAY GLUCOSE BLOOD QUANT: CPT

## 2024-12-21 PROCEDURE — 85025 COMPLETE CBC W/AUTO DIFF WBC: CPT | Performed by: STUDENT IN AN ORGANIZED HEALTH CARE EDUCATION/TRAINING PROGRAM

## 2024-12-21 PROCEDURE — 72125 CT NECK SPINE W/O DYE: CPT

## 2024-12-21 PROCEDURE — 71045 X-RAY EXAM CHEST 1 VIEW: CPT | Performed by: RADIOLOGY

## 2024-12-21 PROCEDURE — 70450 CT HEAD/BRAIN W/O DYE: CPT | Performed by: STUDENT IN AN ORGANIZED HEALTH CARE EDUCATION/TRAINING PROGRAM

## 2024-12-21 PROCEDURE — 84484 ASSAY OF TROPONIN QUANT: CPT | Performed by: EMERGENCY MEDICINE

## 2024-12-21 PROCEDURE — 36415 COLL VENOUS BLD VENIPUNCTURE: CPT | Performed by: STUDENT IN AN ORGANIZED HEALTH CARE EDUCATION/TRAINING PROGRAM

## 2024-12-21 PROCEDURE — 2500000004 HC RX 250 GENERAL PHARMACY W/ HCPCS (ALT 636 FOR OP/ED): Performed by: EMERGENCY MEDICINE

## 2024-12-21 PROCEDURE — 36415 COLL VENOUS BLD VENIPUNCTURE: CPT | Performed by: EMERGENCY MEDICINE

## 2024-12-21 PROCEDURE — 71045 X-RAY EXAM CHEST 1 VIEW: CPT

## 2024-12-21 PROCEDURE — 2060000001 HC INTERMEDIATE ICU ROOM DAILY

## 2024-12-21 PROCEDURE — 83735 ASSAY OF MAGNESIUM: CPT | Performed by: STUDENT IN AN ORGANIZED HEALTH CARE EDUCATION/TRAINING PROGRAM

## 2024-12-21 PROCEDURE — 82550 ASSAY OF CK (CPK): CPT | Performed by: STUDENT IN AN ORGANIZED HEALTH CARE EDUCATION/TRAINING PROGRAM

## 2024-12-21 PROCEDURE — G0390 TRAUMA RESPONS W/HOSP CRITI: HCPCS

## 2024-12-21 PROCEDURE — 72125 CT NECK SPINE W/O DYE: CPT | Performed by: STUDENT IN AN ORGANIZED HEALTH CARE EDUCATION/TRAINING PROGRAM

## 2024-12-21 PROCEDURE — 73502 X-RAY EXAM HIP UNI 2-3 VIEWS: CPT | Mod: LT

## 2024-12-21 PROCEDURE — 96374 THER/PROPH/DIAG INJ IV PUSH: CPT

## 2024-12-21 PROCEDURE — 81001 URINALYSIS AUTO W/SCOPE: CPT | Performed by: STUDENT IN AN ORGANIZED HEALTH CARE EDUCATION/TRAINING PROGRAM

## 2024-12-21 RX ORDER — TALC
3 POWDER (GRAM) TOPICAL NIGHTLY PRN
Status: DISCONTINUED | OUTPATIENT
Start: 2024-12-21 | End: 2024-12-26 | Stop reason: HOSPADM

## 2024-12-21 RX ORDER — DEXTROSE 50 % IN WATER (D50W) INTRAVENOUS SYRINGE
25
Status: DISCONTINUED | OUTPATIENT
Start: 2024-12-21 | End: 2024-12-26 | Stop reason: HOSPADM

## 2024-12-21 RX ORDER — BISACODYL 10 MG/1
10 SUPPOSITORY RECTAL DAILY PRN
Status: DISCONTINUED | OUTPATIENT
Start: 2024-12-21 | End: 2024-12-26 | Stop reason: HOSPADM

## 2024-12-21 RX ORDER — ACETAMINOPHEN 325 MG/1
650 TABLET ORAL EVERY 4 HOURS PRN
Status: DISCONTINUED | OUTPATIENT
Start: 2024-12-21 | End: 2024-12-26 | Stop reason: HOSPADM

## 2024-12-21 RX ORDER — METOPROLOL TARTRATE 25 MG/1
25 TABLET, FILM COATED ORAL 2 TIMES DAILY
Status: DISCONTINUED | OUTPATIENT
Start: 2024-12-21 | End: 2024-12-26 | Stop reason: HOSPADM

## 2024-12-21 RX ORDER — CEFTRIAXONE 1 G/50ML
1 INJECTION, SOLUTION INTRAVENOUS EVERY 24 HOURS
Status: DISCONTINUED | OUTPATIENT
Start: 2024-12-22 | End: 2024-12-22

## 2024-12-21 RX ORDER — METOPROLOL TARTRATE 25 MG/1
25 TABLET, FILM COATED ORAL 2 TIMES DAILY
COMMUNITY
Start: 2024-12-04

## 2024-12-21 RX ORDER — SODIUM CHLORIDE, SODIUM LACTATE, POTASSIUM CHLORIDE, CALCIUM CHLORIDE 600; 310; 30; 20 MG/100ML; MG/100ML; MG/100ML; MG/100ML
100 INJECTION, SOLUTION INTRAVENOUS CONTINUOUS
Status: DISCONTINUED | OUTPATIENT
Start: 2024-12-21 | End: 2024-12-22

## 2024-12-21 RX ORDER — DEXTROSE 50 % IN WATER (D50W) INTRAVENOUS SYRINGE
12.5
Status: DISCONTINUED | OUTPATIENT
Start: 2024-12-21 | End: 2024-12-26 | Stop reason: HOSPADM

## 2024-12-21 RX ORDER — MONTELUKAST SODIUM 10 MG/1
10 TABLET ORAL DAILY
Status: DISCONTINUED | OUTPATIENT
Start: 2024-12-22 | End: 2024-12-26 | Stop reason: HOSPADM

## 2024-12-21 RX ORDER — BISACODYL 5 MG
10 TABLET, DELAYED RELEASE (ENTERIC COATED) ORAL DAILY PRN
Status: DISCONTINUED | OUTPATIENT
Start: 2024-12-21 | End: 2024-12-26 | Stop reason: HOSPADM

## 2024-12-21 RX ORDER — ALBUTEROL SULFATE 90 UG/1
2 INHALANT RESPIRATORY (INHALATION) EVERY 4 HOURS PRN
Status: DISCONTINUED | OUTPATIENT
Start: 2024-12-21 | End: 2024-12-24

## 2024-12-21 RX ORDER — POLYETHYLENE GLYCOL 3350 17 G/17G
17 POWDER, FOR SOLUTION ORAL DAILY
Status: DISCONTINUED | OUTPATIENT
Start: 2024-12-22 | End: 2024-12-26 | Stop reason: HOSPADM

## 2024-12-21 RX ORDER — INSULIN LISPRO 100 [IU]/ML
0-10 INJECTION, SOLUTION INTRAVENOUS; SUBCUTANEOUS
Status: DISCONTINUED | OUTPATIENT
Start: 2024-12-21 | End: 2024-12-26 | Stop reason: HOSPADM

## 2024-12-21 RX ORDER — CEFTRIAXONE 2 G/50ML
2 INJECTION, SOLUTION INTRAVENOUS ONCE
Status: COMPLETED | OUTPATIENT
Start: 2024-12-21 | End: 2024-12-22

## 2024-12-21 RX ORDER — PANTOPRAZOLE SODIUM 40 MG/1
40 TABLET, DELAYED RELEASE ORAL
Status: DISCONTINUED | OUTPATIENT
Start: 2024-12-22 | End: 2024-12-26 | Stop reason: HOSPADM

## 2024-12-21 RX ORDER — ONDANSETRON HYDROCHLORIDE 2 MG/ML
4 INJECTION, SOLUTION INTRAVENOUS EVERY 8 HOURS PRN
Status: DISCONTINUED | OUTPATIENT
Start: 2024-12-21 | End: 2024-12-26 | Stop reason: HOSPADM

## 2024-12-21 RX ORDER — ONDANSETRON 4 MG/1
4 TABLET, FILM COATED ORAL EVERY 8 HOURS PRN
Status: DISCONTINUED | OUTPATIENT
Start: 2024-12-21 | End: 2024-12-26 | Stop reason: HOSPADM

## 2024-12-21 RX ORDER — GUAIFENESIN 600 MG/1
600 TABLET, EXTENDED RELEASE ORAL EVERY 12 HOURS PRN
Status: DISCONTINUED | OUTPATIENT
Start: 2024-12-21 | End: 2024-12-26 | Stop reason: HOSPADM

## 2024-12-21 RX ORDER — CEFTRIAXONE 1 G/50ML
1 INJECTION, SOLUTION INTRAVENOUS EVERY 24 HOURS
Status: DISCONTINUED | OUTPATIENT
Start: 2024-12-22 | End: 2024-12-21

## 2024-12-21 RX ORDER — MAGNESIUM SULFATE HEPTAHYDRATE 40 MG/ML
2 INJECTION, SOLUTION INTRAVENOUS ONCE
Status: COMPLETED | OUTPATIENT
Start: 2024-12-21 | End: 2024-12-22

## 2024-12-21 RX ORDER — PANTOPRAZOLE SODIUM 40 MG/10ML
40 INJECTION, POWDER, LYOPHILIZED, FOR SOLUTION INTRAVENOUS
Status: DISCONTINUED | OUTPATIENT
Start: 2024-12-22 | End: 2024-12-26 | Stop reason: HOSPADM

## 2024-12-21 RX ADMIN — ACETAMINOPHEN 650 MG: 325 TABLET ORAL at 22:12

## 2024-12-21 RX ADMIN — CEFTRIAXONE SODIUM 2 G: 2 INJECTION, SOLUTION INTRAVENOUS at 22:35

## 2024-12-21 RX ADMIN — SODIUM CHLORIDE 1000 ML: 9 INJECTION, SOLUTION INTRAVENOUS at 16:53

## 2024-12-21 ASSESSMENT — PAIN SCALES - GENERAL
PAINLEVEL_OUTOF10: 0 - NO PAIN
PAINLEVEL_OUTOF10: 5 - MODERATE PAIN
PAINLEVEL_OUTOF10: 4
PAINLEVEL_OUTOF10: 5 - MODERATE PAIN
PAINLEVEL_OUTOF10: 6
PAINLEVEL_OUTOF10: 7
PAINLEVEL_OUTOF10: 3
PAINLEVEL_OUTOF10: 5 - MODERATE PAIN
PAINLEVEL_OUTOF10: 3
PAINLEVEL_OUTOF10: 7
PAINLEVEL_OUTOF10: 4
PAINLEVEL_OUTOF10: 6
PAINLEVEL_OUTOF10: 5 - MODERATE PAIN

## 2024-12-21 ASSESSMENT — ENCOUNTER SYMPTOMS
HEADACHES: 0
CHILLS: 0
FATIGUE: 0
VOMITING: 1
APPETITE CHANGE: 0
NECK STIFFNESS: 0
FATIGUE: 1
BLOOD IN STOOL: 0
CHEST TIGHTNESS: 0
LIGHT-HEADEDNESS: 0
DIARRHEA: 0
CONFUSION: 0
ABDOMINAL PAIN: 1
HEMATURIA: 0
PALPITATIONS: 0
NECK PAIN: 0
DYSURIA: 0
COLOR CHANGE: 0
SHORTNESS OF BREATH: 0
PHOTOPHOBIA: 0
NAUSEA: 1
ACTIVITY CHANGE: 1
SLEEP DISTURBANCE: 0
NAUSEA: 0
BACK PAIN: 1
COUGH: 1
ARTHRALGIAS: 1
APPETITE CHANGE: 1
POLYDIPSIA: 0
WEAKNESS: 1
FEVER: 0
TREMORS: 0
DIZZINESS: 0

## 2024-12-21 ASSESSMENT — LIFESTYLE VARIABLES
TOTAL SCORE: 0
HAVE PEOPLE ANNOYED YOU BY CRITICIZING YOUR DRINKING: NO
EVER FELT BAD OR GUILTY ABOUT YOUR DRINKING: NO
EVER HAD A DRINK FIRST THING IN THE MORNING TO STEADY YOUR NERVES TO GET RID OF A HANGOVER: NO
HAVE YOU EVER FELT YOU SHOULD CUT DOWN ON YOUR DRINKING: NO

## 2024-12-21 ASSESSMENT — PAIN DESCRIPTION - DESCRIPTORS
DESCRIPTORS: ACHING

## 2024-12-21 ASSESSMENT — PAIN DESCRIPTION - ORIENTATION: ORIENTATION: LEFT

## 2024-12-21 ASSESSMENT — PAIN DESCRIPTION - LOCATION: LOCATION: HIP

## 2024-12-21 ASSESSMENT — PAIN DESCRIPTION - PAIN TYPE: TYPE: CHRONIC PAIN

## 2024-12-21 NOTE — ED TRIAGE NOTES
Pt to ER via ambulance with c/o chronic right hip pain after falling around 2 am.  Pt denies loss of consciousness, pupils equal and reactive to light.

## 2024-12-22 VITALS
RESPIRATION RATE: 20 BRPM | SYSTOLIC BLOOD PRESSURE: 129 MMHG | TEMPERATURE: 97.7 F | HEART RATE: 82 BPM | DIASTOLIC BLOOD PRESSURE: 74 MMHG | HEIGHT: 66 IN | BODY MASS INDEX: 43.68 KG/M2 | WEIGHT: 271.8 LBS | OXYGEN SATURATION: 96 %

## 2024-12-22 LAB
ALBUMIN SERPL BCP-MCNC: 3.2 G/DL (ref 3.4–5)
ALP SERPL-CCNC: 58 U/L (ref 33–136)
ALT SERPL W P-5'-P-CCNC: 29 U/L (ref 7–45)
ANION GAP SERPL CALC-SCNC: 10 MMOL/L (ref 10–20)
AST SERPL W P-5'-P-CCNC: 42 U/L (ref 9–39)
BACTERIA BLD CULT: NORMAL
BASOPHILS # BLD AUTO: 0.04 X10*3/UL (ref 0–0.1)
BASOPHILS NFR BLD AUTO: 0.3 %
BILIRUB SERPL-MCNC: 1.6 MG/DL (ref 0–1.2)
BUN SERPL-MCNC: 24 MG/DL (ref 6–23)
CALCIUM SERPL-MCNC: 7.8 MG/DL (ref 8.6–10.3)
CHLORIDE SERPL-SCNC: 102 MMOL/L (ref 98–107)
CK SERPL-CCNC: 876 U/L (ref 0–215)
CO2 SERPL-SCNC: 27 MMOL/L (ref 21–32)
CREAT SERPL-MCNC: 1.18 MG/DL (ref 0.5–1.05)
EGFRCR SERPLBLD CKD-EPI 2021: 49 ML/MIN/1.73M*2
EOSINOPHIL # BLD AUTO: 0.02 X10*3/UL (ref 0–0.4)
EOSINOPHIL NFR BLD AUTO: 0.2 %
ERYTHROCYTE [DISTWIDTH] IN BLOOD BY AUTOMATED COUNT: 13.9 % (ref 11.5–14.5)
GLUCOSE BLD MANUAL STRIP-MCNC: 111 MG/DL (ref 74–99)
GLUCOSE BLD MANUAL STRIP-MCNC: 145 MG/DL (ref 74–99)
GLUCOSE BLD MANUAL STRIP-MCNC: 151 MG/DL (ref 74–99)
GLUCOSE BLD MANUAL STRIP-MCNC: 160 MG/DL (ref 74–99)
GLUCOSE BLD MANUAL STRIP-MCNC: 166 MG/DL (ref 74–99)
GLUCOSE SERPL-MCNC: 132 MG/DL (ref 74–99)
GRAM STN SPEC: ABNORMAL
HCT VFR BLD AUTO: 32.9 % (ref 36–46)
HGB BLD-MCNC: 10.6 G/DL (ref 12–16)
HOLD SPECIMEN: NORMAL
IMM GRANULOCYTES # BLD AUTO: 0.05 X10*3/UL (ref 0–0.5)
IMM GRANULOCYTES NFR BLD AUTO: 0.4 % (ref 0–0.9)
LACTATE SERPL-SCNC: 0.7 MMOL/L (ref 0.4–2)
LYMPHOCYTES # BLD AUTO: 1.24 X10*3/UL (ref 0.8–3)
LYMPHOCYTES NFR BLD AUTO: 10.4 %
MAGNESIUM SERPL-MCNC: 1.97 MG/DL (ref 1.6–2.4)
MCH RBC QN AUTO: 27 PG (ref 26–34)
MCHC RBC AUTO-ENTMCNC: 32.2 G/DL (ref 32–36)
MCV RBC AUTO: 84 FL (ref 80–100)
MONOCYTES # BLD AUTO: 0.77 X10*3/UL (ref 0.05–0.8)
MONOCYTES NFR BLD AUTO: 6.4 %
NEUTROPHILS # BLD AUTO: 9.82 X10*3/UL (ref 1.6–5.5)
NEUTROPHILS NFR BLD AUTO: 82.3 %
NRBC BLD-RTO: 0 /100 WBCS (ref 0–0)
PLATELET # BLD AUTO: 194 X10*3/UL (ref 150–450)
POTASSIUM SERPL-SCNC: 3.5 MMOL/L (ref 3.5–5.3)
PROT SERPL-MCNC: 5.5 G/DL (ref 6.4–8.2)
RBC # BLD AUTO: 3.92 X10*6/UL (ref 4–5.2)
SODIUM SERPL-SCNC: 135 MMOL/L (ref 136–145)
WBC # BLD AUTO: 11.9 X10*3/UL (ref 4.4–11.3)

## 2024-12-22 PROCEDURE — 80053 COMPREHEN METABOLIC PANEL: CPT | Performed by: STUDENT IN AN ORGANIZED HEALTH CARE EDUCATION/TRAINING PROGRAM

## 2024-12-22 PROCEDURE — 36415 COLL VENOUS BLD VENIPUNCTURE: CPT | Performed by: STUDENT IN AN ORGANIZED HEALTH CARE EDUCATION/TRAINING PROGRAM

## 2024-12-22 PROCEDURE — 2500000004 HC RX 250 GENERAL PHARMACY W/ HCPCS (ALT 636 FOR OP/ED): Performed by: INTERNAL MEDICINE

## 2024-12-22 PROCEDURE — 85025 COMPLETE CBC W/AUTO DIFF WBC: CPT | Performed by: STUDENT IN AN ORGANIZED HEALTH CARE EDUCATION/TRAINING PROGRAM

## 2024-12-22 PROCEDURE — 2500000001 HC RX 250 WO HCPCS SELF ADMINISTERED DRUGS (ALT 637 FOR MEDICARE OP): Performed by: INTERNAL MEDICINE

## 2024-12-22 PROCEDURE — 2500000004 HC RX 250 GENERAL PHARMACY W/ HCPCS (ALT 636 FOR OP/ED): Performed by: STUDENT IN AN ORGANIZED HEALTH CARE EDUCATION/TRAINING PROGRAM

## 2024-12-22 PROCEDURE — 82947 ASSAY GLUCOSE BLOOD QUANT: CPT

## 2024-12-22 PROCEDURE — 83605 ASSAY OF LACTIC ACID: CPT | Performed by: STUDENT IN AN ORGANIZED HEALTH CARE EDUCATION/TRAINING PROGRAM

## 2024-12-22 PROCEDURE — 83735 ASSAY OF MAGNESIUM: CPT | Performed by: STUDENT IN AN ORGANIZED HEALTH CARE EDUCATION/TRAINING PROGRAM

## 2024-12-22 PROCEDURE — 97166 OT EVAL MOD COMPLEX 45 MIN: CPT | Mod: GO

## 2024-12-22 PROCEDURE — 99233 SBSQ HOSP IP/OBS HIGH 50: CPT | Performed by: INTERNAL MEDICINE

## 2024-12-22 PROCEDURE — 82550 ASSAY OF CK (CPK): CPT | Performed by: STUDENT IN AN ORGANIZED HEALTH CARE EDUCATION/TRAINING PROGRAM

## 2024-12-22 PROCEDURE — 2060000001 HC INTERMEDIATE ICU ROOM DAILY

## 2024-12-22 PROCEDURE — 2500000001 HC RX 250 WO HCPCS SELF ADMINISTERED DRUGS (ALT 637 FOR MEDICARE OP): Performed by: STUDENT IN AN ORGANIZED HEALTH CARE EDUCATION/TRAINING PROGRAM

## 2024-12-22 PROCEDURE — 97162 PT EVAL MOD COMPLEX 30 MIN: CPT | Mod: GP | Performed by: PHYSICAL THERAPIST

## 2024-12-22 RX ORDER — ONDANSETRON 4 MG/1
4 TABLET, ORALLY DISINTEGRATING ORAL EVERY 8 HOURS PRN
COMMUNITY

## 2024-12-22 RX ORDER — ALBUTEROL SULFATE 0.83 MG/ML
2.5 SOLUTION RESPIRATORY (INHALATION) EVERY 6 HOURS PRN
COMMUNITY

## 2024-12-22 RX ORDER — SODIUM CHLORIDE, SODIUM LACTATE, POTASSIUM CHLORIDE, CALCIUM CHLORIDE 600; 310; 30; 20 MG/100ML; MG/100ML; MG/100ML; MG/100ML
100 INJECTION, SOLUTION INTRAVENOUS CONTINUOUS
Status: ACTIVE | OUTPATIENT
Start: 2024-12-22 | End: 2024-12-23

## 2024-12-22 RX ORDER — BENZONATATE 100 MG/1
100 CAPSULE ORAL 3 TIMES DAILY PRN
COMMUNITY

## 2024-12-22 RX ORDER — CEFTRIAXONE 2 G/50ML
2 INJECTION, SOLUTION INTRAVENOUS EVERY 24 HOURS
Status: DISCONTINUED | OUTPATIENT
Start: 2024-12-22 | End: 2024-12-26 | Stop reason: HOSPADM

## 2024-12-22 RX ORDER — SODIUM CHLORIDE, SODIUM LACTATE, POTASSIUM CHLORIDE, CALCIUM CHLORIDE 600; 310; 30; 20 MG/100ML; MG/100ML; MG/100ML; MG/100ML
100 INJECTION, SOLUTION INTRAVENOUS CONTINUOUS
Status: DISCONTINUED | OUTPATIENT
Start: 2024-12-22 | End: 2024-12-22

## 2024-12-22 RX ADMIN — PANTOPRAZOLE SODIUM 40 MG: 40 TABLET, DELAYED RELEASE ORAL at 08:22

## 2024-12-22 RX ADMIN — APIXABAN 5 MG: 5 TABLET, FILM COATED ORAL at 21:05

## 2024-12-22 RX ADMIN — APIXABAN 2.5 MG: 2.5 TABLET, FILM COATED ORAL at 01:12

## 2024-12-22 RX ADMIN — APIXABAN 2.5 MG: 2.5 TABLET, FILM COATED ORAL at 08:22

## 2024-12-22 RX ADMIN — METOPROLOL TARTRATE 25 MG: 25 TABLET, FILM COATED ORAL at 21:05

## 2024-12-22 RX ADMIN — METOPROLOL TARTRATE 25 MG: 25 TABLET, FILM COATED ORAL at 01:12

## 2024-12-22 RX ADMIN — SODIUM CHLORIDE, POTASSIUM CHLORIDE, SODIUM LACTATE AND CALCIUM CHLORIDE 100 ML/HR: 600; 310; 30; 20 INJECTION, SOLUTION INTRAVENOUS at 00:08

## 2024-12-22 RX ADMIN — MAGNESIUM SULFATE HEPTAHYDRATE 2 G: 40 INJECTION, SOLUTION INTRAVENOUS at 01:12

## 2024-12-22 RX ADMIN — CEFTRIAXONE SODIUM 2 G: 2 INJECTION, SOLUTION INTRAVENOUS at 23:32

## 2024-12-22 RX ADMIN — MONTELUKAST 10 MG: 10 TABLET, FILM COATED ORAL at 08:22

## 2024-12-22 RX ADMIN — SODIUM CHLORIDE, POTASSIUM CHLORIDE, SODIUM LACTATE AND CALCIUM CHLORIDE 100 ML/HR: 600; 310; 30; 20 INJECTION, SOLUTION INTRAVENOUS at 08:22

## 2024-12-22 RX ADMIN — METOPROLOL TARTRATE 25 MG: 25 TABLET, FILM COATED ORAL at 08:22

## 2024-12-22 SDOH — ECONOMIC STABILITY: FOOD INSECURITY: WITHIN THE PAST 12 MONTHS, YOU WORRIED THAT YOUR FOOD WOULD RUN OUT BEFORE YOU GOT THE MONEY TO BUY MORE.: NEVER TRUE

## 2024-12-22 SDOH — HEALTH STABILITY: MENTAL HEALTH: HOW OFTEN DO YOU HAVE A DRINK CONTAINING ALCOHOL?: NEVER

## 2024-12-22 SDOH — ECONOMIC STABILITY: HOUSING INSECURITY: AT ANY TIME IN THE PAST 12 MONTHS, WERE YOU HOMELESS OR LIVING IN A SHELTER (INCLUDING NOW)?: NO

## 2024-12-22 SDOH — ECONOMIC STABILITY: INCOME INSECURITY: IN THE PAST 12 MONTHS HAS THE ELECTRIC, GAS, OIL, OR WATER COMPANY THREATENED TO SHUT OFF SERVICES IN YOUR HOME?: NO

## 2024-12-22 SDOH — ECONOMIC STABILITY: TRANSPORTATION INSECURITY: IN THE PAST 12 MONTHS, HAS LACK OF TRANSPORTATION KEPT YOU FROM MEDICAL APPOINTMENTS OR FROM GETTING MEDICATIONS?: NO

## 2024-12-22 SDOH — ECONOMIC STABILITY: FOOD INSECURITY: WITHIN THE PAST 12 MONTHS, THE FOOD YOU BOUGHT JUST DIDN'T LAST AND YOU DIDN'T HAVE MONEY TO GET MORE.: NEVER TRUE

## 2024-12-22 SDOH — SOCIAL STABILITY: SOCIAL NETWORK
DO YOU BELONG TO ANY CLUBS OR ORGANIZATIONS SUCH AS CHURCH GROUPS, UNIONS, FRATERNAL OR ATHLETIC GROUPS, OR SCHOOL GROUPS?: NO

## 2024-12-22 SDOH — HEALTH STABILITY: MENTAL HEALTH: HOW MANY DRINKS CONTAINING ALCOHOL DO YOU HAVE ON A TYPICAL DAY WHEN YOU ARE DRINKING?: PATIENT DOES NOT DRINK

## 2024-12-22 SDOH — SOCIAL STABILITY: SOCIAL INSECURITY
WITHIN THE LAST YEAR, HAVE YOU BEEN RAPED OR FORCED TO HAVE ANY KIND OF SEXUAL ACTIVITY BY YOUR PARTNER OR EX-PARTNER?: NO

## 2024-12-22 SDOH — HEALTH STABILITY: MENTAL HEALTH
DO YOU FEEL STRESS - TENSE, RESTLESS, NERVOUS, OR ANXIOUS, OR UNABLE TO SLEEP AT NIGHT BECAUSE YOUR MIND IS TROUBLED ALL THE TIME - THESE DAYS?: NOT AT ALL

## 2024-12-22 SDOH — SOCIAL STABILITY: SOCIAL INSECURITY: HAVE YOU HAD THOUGHTS OF HARMING ANYONE ELSE?: NO

## 2024-12-22 SDOH — SOCIAL STABILITY: SOCIAL INSECURITY
WITHIN THE LAST YEAR, HAVE YOU BEEN KICKED, HIT, SLAPPED, OR OTHERWISE PHYSICALLY HURT BY YOUR PARTNER OR EX-PARTNER?: NO

## 2024-12-22 SDOH — HEALTH STABILITY: MENTAL HEALTH: HOW OFTEN DO YOU HAVE SIX OR MORE DRINKS ON ONE OCCASION?: NEVER

## 2024-12-22 SDOH — HEALTH STABILITY: PHYSICAL HEALTH: ON AVERAGE, HOW MANY DAYS PER WEEK DO YOU ENGAGE IN MODERATE TO STRENUOUS EXERCISE (LIKE A BRISK WALK)?: 0 DAYS

## 2024-12-22 SDOH — SOCIAL STABILITY: SOCIAL INSECURITY: WITHIN THE LAST YEAR, HAVE YOU BEEN HUMILIATED OR EMOTIONALLY ABUSED IN OTHER WAYS BY YOUR PARTNER OR EX-PARTNER?: NO

## 2024-12-22 SDOH — HEALTH STABILITY: PHYSICAL HEALTH: ON AVERAGE, HOW MANY MINUTES DO YOU ENGAGE IN EXERCISE AT THIS LEVEL?: 0 MIN

## 2024-12-22 SDOH — SOCIAL STABILITY: SOCIAL INSECURITY: ARE YOU OR HAVE YOU BEEN THREATENED OR ABUSED PHYSICALLY, EMOTIONALLY, OR SEXUALLY BY ANYONE?: NO

## 2024-12-22 SDOH — SOCIAL STABILITY: SOCIAL INSECURITY: DO YOU FEEL UNSAFE GOING BACK TO THE PLACE WHERE YOU ARE LIVING?: NO

## 2024-12-22 SDOH — SOCIAL STABILITY: SOCIAL NETWORK
IN A TYPICAL WEEK, HOW MANY TIMES DO YOU TALK ON THE PHONE WITH FAMILY, FRIENDS, OR NEIGHBORS?: MORE THAN THREE TIMES A WEEK

## 2024-12-22 SDOH — SOCIAL STABILITY: SOCIAL INSECURITY: ARE YOU MARRIED, WIDOWED, DIVORCED, SEPARATED, NEVER MARRIED, OR LIVING WITH A PARTNER?: WIDOWED

## 2024-12-22 SDOH — SOCIAL STABILITY: SOCIAL INSECURITY: ABUSE: ADULT

## 2024-12-22 SDOH — HEALTH STABILITY: PHYSICAL HEALTH
HOW OFTEN DO YOU NEED TO HAVE SOMEONE HELP YOU WHEN YOU READ INSTRUCTIONS, PAMPHLETS, OR OTHER WRITTEN MATERIAL FROM YOUR DOCTOR OR PHARMACY?: NEVER

## 2024-12-22 SDOH — ECONOMIC STABILITY: HOUSING INSECURITY: IN THE PAST 12 MONTHS, HOW MANY TIMES HAVE YOU MOVED WHERE YOU WERE LIVING?: 0

## 2024-12-22 SDOH — SOCIAL STABILITY: SOCIAL INSECURITY: HAS ANYONE EVER THREATENED TO HURT YOUR FAMILY OR YOUR PETS?: NO

## 2024-12-22 SDOH — SOCIAL STABILITY: SOCIAL INSECURITY: HAVE YOU HAD ANY THOUGHTS OF HARMING ANYONE ELSE?: NO

## 2024-12-22 SDOH — SOCIAL STABILITY: SOCIAL INSECURITY: DOES ANYONE TRY TO KEEP YOU FROM HAVING/CONTACTING OTHER FRIENDS OR DOING THINGS OUTSIDE YOUR HOME?: NO

## 2024-12-22 SDOH — SOCIAL STABILITY: SOCIAL INSECURITY: WITHIN THE LAST YEAR, HAVE YOU BEEN AFRAID OF YOUR PARTNER OR EX-PARTNER?: NO

## 2024-12-22 SDOH — SOCIAL STABILITY: SOCIAL NETWORK: HOW OFTEN DO YOU ATTEND MEETINGS OF THE CLUBS OR ORGANIZATIONS YOU BELONG TO?: NEVER

## 2024-12-22 SDOH — ECONOMIC STABILITY: HOUSING INSECURITY: IN THE LAST 12 MONTHS, WAS THERE A TIME WHEN YOU WERE NOT ABLE TO PAY THE MORTGAGE OR RENT ON TIME?: NO

## 2024-12-22 SDOH — SOCIAL STABILITY: SOCIAL INSECURITY: DO YOU FEEL ANYONE HAS EXPLOITED OR TAKEN ADVANTAGE OF YOU FINANCIALLY OR OF YOUR PERSONAL PROPERTY?: NO

## 2024-12-22 SDOH — ECONOMIC STABILITY: FOOD INSECURITY: HOW HARD IS IT FOR YOU TO PAY FOR THE VERY BASICS LIKE FOOD, HOUSING, MEDICAL CARE, AND HEATING?: NOT HARD AT ALL

## 2024-12-22 SDOH — SOCIAL STABILITY: SOCIAL NETWORK: HOW OFTEN DO YOU GET TOGETHER WITH FRIENDS OR RELATIVES?: MORE THAN THREE TIMES A WEEK

## 2024-12-22 SDOH — SOCIAL STABILITY: SOCIAL INSECURITY: ARE THERE ANY APPARENT SIGNS OF INJURIES/BEHAVIORS THAT COULD BE RELATED TO ABUSE/NEGLECT?: NO

## 2024-12-22 SDOH — SOCIAL STABILITY: SOCIAL NETWORK: HOW OFTEN DO YOU ATTEND CHURCH OR RELIGIOUS SERVICES?: 1 TO 4 TIMES PER YEAR

## 2024-12-22 ASSESSMENT — PATIENT HEALTH QUESTIONNAIRE - PHQ9
1. LITTLE INTEREST OR PLEASURE IN DOING THINGS: NOT AT ALL
SUM OF ALL RESPONSES TO PHQ9 QUESTIONS 1 & 2: 3
2. FEELING DOWN, DEPRESSED OR HOPELESS: NEARLY EVERY DAY

## 2024-12-22 ASSESSMENT — COGNITIVE AND FUNCTIONAL STATUS - GENERAL
HELP NEEDED FOR BATHING: A LOT
STANDING UP FROM CHAIR USING ARMS: A LOT
TURNING FROM BACK TO SIDE WHILE IN FLAT BAD: A LOT
TURNING FROM BACK TO SIDE WHILE IN FLAT BAD: A LOT
STANDING UP FROM CHAIR USING ARMS: A LOT
CLIMB 3 TO 5 STEPS WITH RAILING: TOTAL
MOBILITY SCORE: 12
PERSONAL GROOMING: A LITTLE
PERSONAL GROOMING: A LOT
MOBILITY SCORE: 8
MOVING TO AND FROM BED TO CHAIR: TOTAL
DRESSING REGULAR LOWER BODY CLOTHING: A LOT
DRESSING REGULAR LOWER BODY CLOTHING: A LOT
HELP NEEDED FOR BATHING: A LOT
DRESSING REGULAR UPPER BODY CLOTHING: A LOT
EATING MEALS: A LITTLE
HELP NEEDED FOR BATHING: A LOT
DRESSING REGULAR UPPER BODY CLOTHING: A LOT
CLIMB 3 TO 5 STEPS WITH RAILING: A LOT
CLIMB 3 TO 5 STEPS WITH RAILING: A LOT
TOILETING: A LOT
MOVING FROM LYING ON BACK TO SITTING ON SIDE OF FLAT BED WITH BEDRAILS: A LOT
WALKING IN HOSPITAL ROOM: TOTAL
MOBILITY SCORE: 12
DAILY ACTIVITIY SCORE: 13
PERSONAL GROOMING: A LOT
EATING MEALS: A LITTLE
TOILETING: TOTAL
MOVING FROM LYING ON BACK TO SITTING ON SIDE OF FLAT BED WITH BEDRAILS: A LOT
DAILY ACTIVITIY SCORE: 13
MOVING TO AND FROM BED TO CHAIR: A LOT
TURNING FROM BACK TO SIDE WHILE IN FLAT BAD: A LOT
WALKING IN HOSPITAL ROOM: A LOT
MOVING FROM LYING ON BACK TO SITTING ON SIDE OF FLAT BED WITH BEDRAILS: A LOT
TOILETING: A LOT
STANDING UP FROM CHAIR USING ARMS: TOTAL
PATIENT BASELINE BEDBOUND: NO
DRESSING REGULAR LOWER BODY CLOTHING: TOTAL
MOVING TO AND FROM BED TO CHAIR: A LOT
WALKING IN HOSPITAL ROOM: A LOT
DAILY ACTIVITIY SCORE: 12
DRESSING REGULAR UPPER BODY CLOTHING: A LOT
EATING MEALS: A LITTLE

## 2024-12-22 ASSESSMENT — LIFESTYLE VARIABLES
HOW OFTEN DO YOU HAVE 6 OR MORE DRINKS ON ONE OCCASION: NEVER
AUDIT-C TOTAL SCORE: 0
SKIP TO QUESTIONS 9-10: 1
SUBSTANCE_ABUSE_PAST_12_MONTHS: NO
SKIP TO QUESTIONS 9-10: 1
HOW MANY STANDARD DRINKS CONTAINING ALCOHOL DO YOU HAVE ON A TYPICAL DAY: PATIENT DOES NOT DRINK
HOW OFTEN DO YOU HAVE A DRINK CONTAINING ALCOHOL: NEVER
AUDIT-C TOTAL SCORE: 0
PRESCIPTION_ABUSE_PAST_12_MONTHS: NO
SKIP TO QUESTIONS 9-10: 1

## 2024-12-22 ASSESSMENT — ACTIVITIES OF DAILY LIVING (ADL)
BATHING: NEEDS ASSISTANCE
PATIENT'S MEMORY ADEQUATE TO SAFELY COMPLETE DAILY ACTIVITIES?: NO
ASSISTIVE_DEVICE: WALKER
BATHING_ASSISTANCE: MAXIMAL
TOILETING: NEEDS ASSISTANCE
DRESSING YOURSELF: NEEDS ASSISTANCE
HEARING - LEFT EAR: HEARING AID
HEARING - LEFT EAR: HEARING AID
ADEQUATE_TO_COMPLETE_ADL: YES
ASSISTIVE_DEVICE: WALKER
ADL_ASSISTANCE: INDEPENDENT
PATIENT'S MEMORY ADEQUATE TO SAFELY COMPLETE DAILY ACTIVITIES?: NO
BATHING: NEEDS ASSISTANCE
LACK_OF_TRANSPORTATION: NO
DRESSING YOURSELF: NEEDS ASSISTANCE
JUDGMENT_ADEQUATE_SAFELY_COMPLETE_DAILY_ACTIVITIES: NO
WALKS IN HOME: NEEDS ASSISTANCE
WALKS IN HOME: NEEDS ASSISTANCE
GROOMING: NEEDS ASSISTANCE
HEARING - RIGHT EAR: HEARING AID
HEARING - RIGHT EAR: HEARING AID
TOILETING: NEEDS ASSISTANCE
FEEDING YOURSELF: INDEPENDENT
FEEDING YOURSELF: INDEPENDENT
ADEQUATE_TO_COMPLETE_ADL: YES
JUDGMENT_ADEQUATE_SAFELY_COMPLETE_DAILY_ACTIVITIES: NO
GROOMING: NEEDS ASSISTANCE

## 2024-12-22 ASSESSMENT — PAIN - FUNCTIONAL ASSESSMENT
PAIN_FUNCTIONAL_ASSESSMENT: 0-10

## 2024-12-22 ASSESSMENT — PAIN SCALES - GENERAL
PAINLEVEL_OUTOF10: 3
PAINLEVEL_OUTOF10: 0 - NO PAIN

## 2024-12-22 NOTE — ED PROVIDER NOTES
HPI   Chief Complaint   Patient presents with    Fall     C/O fall at 2 am today, doesn't remember what made her fall.    Altered Mental Status     Family called caleb because they feel pt is more confused than usual.       Patient presents emergency department after a fall out of her chair today.  She is complaining of left hip pain.  States she has chronic hip pain but is a little bit worse.  She denies hitting her head, loss of consciousness.  Patient is reportedly on Eliquis.  Patient has had stomach bug, RSV and started on doxycycline for concern of pneumonia.  She has had decreased oral intake and some nausea vomiting.  She started on Zofran by her doctor.  Patient has had poor p.o. intake reported by family.                          Ivory Coma Scale Score: 15         NIH Stroke Scale: 0          Patient History   Past Medical History:   Diagnosis Date    Age-related nuclear cataract of right eye 08/05/2019    Other specified anxiety disorders 07/13/2018    Depression with anxiety    Personal history of other diseases of the circulatory system 11/10/2017    History of essential hypertension    Personal history of other diseases of the respiratory system 11/10/2017    History of asthma    Personal history of other specified conditions     History of palpitations    Unspecified asthma, uncomplicated ([facility]) 12/11/2019    Asthma    Urinary incontinence 05/12/2023     No past surgical history on file.  No family history on file.  Social History     Tobacco Use    Smoking status: Never    Smokeless tobacco: Never   Vaping Use    Vaping status: Never Used   Substance Use Topics    Alcohol use: Never    Drug use: Never       Physical Exam   ED Triage Vitals   Temperature Heart Rate Respirations BP   12/21/24 1556 12/21/24 1556 12/21/24 1556 12/21/24 1556   36.9 °C (98.4 °F) 89 20 105/54      Pulse Ox Temp Source Heart Rate Source Patient Position   12/21/24 1556 12/21/24 1556 -- 12/21/24 1556   96 % Tympanic   Sitting      BP Location FiO2 (%)     12/21/24 1556 12/21/24 1600     Left arm 21 %       PRIMARY SURVEY  Airway: Patent  Breathing: Breath sounds equal  Circulation: 2+ radial, 2+ femoral, 2+ dp/tp  Disability:     Eye: 4     Verbal: 5     Motor: 6    SECONDARY SURVEY  Neurological: Sensation and motor grossly intact in upper and lower extremities, oriented x3  HEENT: Atraumatic, no occlusions. PERRLA, no step offs.  Neck: No midline cervical tenderness, trachea midline, atraumatic  Chest: Atraumatic, nontender  Cardiovascular: Regular rate and rhythm  Abdomen: Atraumatic, nontender  Pelvic/Perineal: Atraumatic, pelvis stable, no blood present  Back/Spine: Atraumatic, nontender, no step offs  Extremities: Atraumatic, left hip tenderness, no deformity    Physical Exam    ED Course & [facility]   Diagnoses as of 12/21/24 2151   Cystitis   Acute kidney injury (CMS-HCC)   Elevated troponin   Hypomagnesemia       Medical Decision Making  Patient presents as a HIA trauma after fall on Eliquis. Available chart reviewed. On initial assessment the patient was found non-toxic, no acute distress, vitals hemodynamically stable and afebrile. Initial concern for fracture, subluxation, internal bleeding, uti.  Urinalysis concerning for infection.  Will start on antibiotics.  Flu and COVID are negative.  Metabolic panel shows elevated BUN and creatinine at 28 and 1.6 respectively.  This is compared to normal creatinine at at [facility] 6 months ago.  Lipase is normal at 3.  Hypomagnesemia 1.49.  CBC with leukocytosis of 15.2, anemia with hemoglobin 11.9, no thrombocytopenia.  Initial troponins elevated at 55 but downtrending to 42.  Given her SHANTANU, elevated trop and UTI will admit for further management. Care discussed with surgical resident and Dr. Chen    Addendum: Sepsis identified with the resulting of the urinalysis at 1954    Procedure  Critical Care    Performed by: Gregory Garcia MD  Authorized by: Gregory Garcia MD     Critical care provider statement:     Critical care time (minutes):  30    Critical care time was exclusive of:  Separately billable procedures and treating other patients and teaching time    Critical care was necessary to treat or prevent imminent or life-threatening deterioration of the following conditions:  Trauma    Critical care was time spent personally by me on the following activities:  Discussions with consultants, obtaining history from patient or surrogate, re-evaluation of patient's condition, ordering and review of radiographic studies and examination of patient       Gregory Garcia MD  12/29/24 7680       Gregory Garcia MD  01/23/25 2580

## 2024-12-22 NOTE — CARE PLAN
The patient's goals for the shift include remain free of falls    The clinical goals for the shift include pt to remain hemodynamically stable throughout the shift      Problem: Pain - Adult  Goal: Verbalizes/displays adequate comfort level or baseline comfort level  Outcome: Progressing     Problem: Safety - Adult  Goal: Free from fall injury  Outcome: Progressing     Problem: Discharge Planning  Goal: Discharge to home or other facility with appropriate resources  Outcome: Progressing     Problem: Chronic Conditions and Co-morbidities  Goal: Patient's chronic conditions and co-morbidity symptoms are monitored and maintained or improved  Outcome: Progressing     Problem: Skin  Goal: Decreased wound size/increased tissue granulation at next dressing change  Outcome: Progressing  Flowsheets (Taken 12/22/2024 1319)  Decreased wound size/increased tissue granulation at next dressing change: Protective dressings over bony prominences  Goal: Participates in plan/prevention/treatment measures  Outcome: Progressing  Flowsheets (Taken 12/22/2024 1319)  Participates in plan/prevention/treatment measures: Elevate heels  Goal: Prevent/manage excess moisture  Outcome: Progressing  Flowsheets (Taken 12/22/2024 1319)  Prevent/manage excess moisture: Follow provider orders for dressing changes  Goal: Prevent/minimize sheer/friction injuries  Outcome: Progressing  Flowsheets (Taken 12/22/2024 1319)  Prevent/minimize sheer/friction injuries: HOB 30 degrees or less  Goal: Promote/optimize nutrition  Outcome: Progressing  Flowsheets (Taken 12/22/2024 1319)  Promote/optimize nutrition: Monitor/record intake including meals  Goal: Promote skin healing  Outcome: Progressing  Flowsheets (Taken 12/22/2024 1319)  Promote skin healing: Rotate device position/do not position patient on device

## 2024-12-22 NOTE — PROGRESS NOTES
Occupational Therapy  Evaluation    Patient Name: Mary Dao  MRN: [id number]  Today's Date: 12/22/2024  Time Calculation  Start Time: 1435  Stop Time: 1456  Time Calculation (min): 21 min    Current Problem:   1. Cystitis    2. Acute kidney injury (CMS-HCC)    3. Elevated troponin    4. Hypomagnesemia        OT order: OT eval and treat   Referred by: Selin  Reason for referral: ADLs, safety assessment (fall out of recliner chair. found down)  Past medical history related to rehab:  has a past medical history of Age-related nuclear cataract of right eye (08/05/2019), Other specified anxiety disorders (07/13/2018), Personal history of other diseases of the circulatory system (11/10/2017), Personal history of other diseases of the respiratory system (11/10/2017), Personal history of other specified conditions, Unspecified asthma, uncomplicated ([facility]) (12/11/2019), and Urinary incontinence (05/12/2023).     Precautions:   Hearing/Visual Limitations: [place]  Medical Precautions: Fall precautions  Precautions Comment: recent RSV    ASSESSMENT  OT Assessment: OT eval completed. The patient is functioning below baseline for ADLs and mobility. can benefit from continued OT. Pt with Decreased ADL status, Decreased safe judgment during ADL, Decreased upper extremity strength, Decreased cognition, Decreased endurance, Decreased functional mobility, Decreased gross motor control, Decreased IADLs     12/22/24 1435   IP OT Assessment   Barriers to Discharge Home Caregiver assistance;Cognition needs;Physical needs   Caregiver Assistance Caregiver assistance needed per identified barriers - however, level of patient's required assistance exceeds assistance available at home   Cognition Needs 24hr supervision for safety awareness needed;Insight of patient limited regarding functional ability/needs;Cognition-related high falls risk   Physical Needs 24hr mobility assistance needed;24hr ADL assistance needed;High falls risk due  to function or environment       PLAN  Frequency: 3 times per week  Treatment Interventions: ADL retraining, Functional transfer training, UE strengthening/ROM, Endurance training, Cognitive reorientation, Patient/family training, Equipment evaluation/education, Neuromuscular reeducation  Discharge Recommendations: Moderate intensity level of continued care  OT OK to discharge: Yes    GENERAL VISIT INFORMATION   Start of session communication: Bedside nurse  End of session communication: Bedside nurse  Family/caregiver present: No  Caregiver feedback:    Co-Treatment: PT  Reason for co-treatment: to optimize safety and mobility, while focusing on discipline specific goals   Position Pt Received:  Bed, 3 rail up, Alarm on  End of session position: Bed, 3 rail up, Alarm on    SUBJECTIVE  Home Living:  Type of Home: House  Lives With: Adult children (daughter Lisa)  Home Adaptive Equipment: Walker rolling or standard (rollator)  Home Layout: One level  Home Access: Stairs to enter with rails  Entrance Stairs-Number of Steps: 1     Prior Level of Function:  Receives Help From: Family  ADL Assistance: Independent (except daughter assists with showers)  Homemaking Assistance: Needs assistance (daughter performs all iadls)  Ambulatory Assistance: Independent (rollator)  Prior Function Comments: +falls    IADL History:       Pain:  Assessment: 0-10  Score:  (generalized c/o pain in legs)  Type:    Location:    Interventions:    Response to pain interventions:      OBJECTIVE  Vital Signs:       Cognition:  Overall Cognitive Status: Impaired (decreased awareness of safety, decreased processing, mildly confused and disoriented to situation)  Arousal/Alertness: Inconsistent responses to stimuli  Orientation Level:  (oriented to person and place. pt unable to state month or year but knows upcoming holiday)  Following Commands:  (decreased command following)  Safety Judgment: Decreased awareness of need for assistance  Problem  Solving: Assistance required to identify errors made  Insight: Moderate  Impulsive: Moderately  Processing Speed: Delayed             Current ADL function:   EATING:  Minimal     GROOMING: Moderate     BATHING: Maximal     UB DRESSING: Maximal     LB DRESSING: Total Don/doff R shoe, Don/doff L shoe   TOILETING: Total    ADL comments:       Activity Tolerance:  Endurance: Decreased tolerance for upright activites    Bed Mobility/Transfers:   Bed Mobility  Bed Mobility: Yes  Bed Mobility 1  Bed Mobility 1: Supine to sitting, Sitting to supine  Level of Assistance 1: Maximum assistance, +2  Bed Mobility 2  Bed Mobility  2: Scooting  Level of Assistance 2: Maximum assistance, +2  Transfers  Transfer: Yes  Transfer 1  Transfer From 1: Sit to  Transfer to 1: Stand  Technique 1: Stand to sit  Transfer Device 1: Walker  Transfer Level of Assistance 1:  (patient not able to perform. dependent for attempt at standing.)  Trials/Comments 1: Patient demonstrated poor sitting balance at EOB. pt appeared to have no awareness of spatial relation and poor proprioceptive skills. pt kept pitching backwards and extending feet. not able to sustain balance long enough to safely tolerate sitting or progress to standing. Therapists spent increased time trying to address balance and assist with correction. attempted to have patient scoot from sitting towards HOB. required max A x2 to scoot ~1 inch. assistance for balance. decreased processing of task.    Ambulation/Gait Training:  Functional Mobility  Functional Mobility Performed: No (not appropriate to assess at this time)    Sitting Balance:  Static Sitting Balance  Static Sitting-Level of Assistance: Maximum assistance  Dynamic Sitting Balance  Dynamic Sitting-Level of Assistance: Maximum assistance    Standing Balance:       Vision: Vision - Basic Assessment  Current Vision: No visual deficits   and      Sensation:  Light Touch: No apparent deficits  Proprioception: Partial deficits in  the LLE, Partial deficits in the RLE, Partial deficits in the LUE, Partial deficits in the RUE    Strength:  Strength Comments: BUE grossly 4-/5    Perception:  Inattention/Neglect: Appears intact    Coordination:  Movements are Fluid and Coordinated: Yes     Hand Function:  Hand Function  Gross Grasp: Functional    Extremities: RUE   RUE : Within Functional Limits and LUE   LUE: Within Functional Limits    Outcome Measures: [facility] Daily Activity   Putting on and taking off regular lower body clothing: Total  Bathing (including washing, rinsing, drying): A lot  Putting on and taking off regular upper body clothing: A lot  Toileting, which includes using toilet, bedpan or urinal: Total  Taking care of personal grooming such as brushing teeth: A little   Eating Meals: A little   Daily Activity - Total Score: 12    EDUCATION:     Education Documentation  ADL Training, taught by Isabell Sarabia OT at 12/22/2024  3:17 PM.  Learner: Patient  Readiness: Acceptance  Method: Explanation  Response: Needs Reinforcement    Education Comments  No comments found.        Goals:   Encounter Problems       Encounter Problems (Active)       ADLs       Patient will complete daily grooming tasks brushing teeth and washing face/hair with set-up, supervision level of assistance and PRN adaptive equipment while supported sitting. (Progressing)       Start:  12/22/24    Expected End:  01/05/25               BALANCE       Pt will maintain dynamic sitting balance during ADL task x15 minutes with supervision level of assistance in order to demonstrate decreased risk of falling and improved postural control. (Progressing)       Start:  12/22/24    Expected End:  01/05/25               TRANSFERS       Patient will complete functional transfers with least restrictive device with minimal assist  level of assistance. (Progressing)       Start:  12/22/24    Expected End:  01/05/25                       Discharged

## 2024-12-22 NOTE — CONSULTS
TRAUMA CONSULTATION NOTE    Mary Dao   1950   [id number]     Consults    Reason For Consult  Limited trauma    History Of Present Illness  Mary Dao is a 74 y.o. female with PMHx of DM, HTN, asthma, arthritis, DVT on eliquis presenting with after an unwitnessed fall. Patient states she was reaching for something and fell out of her chair around 2 am. She was then unable to get back up because of her chjronic hip pain. She denies LOC. Of note Pt recently had RSV followed by the flu. She lives at home with her daughter. No other acute concerns.     Past Medical History  She has a past medical history of Age-related nuclear cataract of right eye (08/05/2019), Other specified anxiety disorders (07/13/2018), Personal history of other diseases of the circulatory system (11/10/2017), Personal history of other diseases of the respiratory system (11/10/2017), Personal history of other specified conditions, Unspecified asthma, uncomplicated ([facility]) (12/11/2019), and Urinary incontinence (05/12/2023).    Surgical History  She has no past surgical history on file.    Medications  No current facility-administered medications on file prior to encounter.     Current Outpatient Medications on File Prior to Encounter   Medication Sig Dispense Refill    albuterol 90 mcg/actuation inhaler Inhale 2 puffs every 4 hours if needed.      baclofen (Lioresal) 10 mg tablet Take 1 tablet (10 mg) by mouth once daily.      budesonide-formoteroL (Symbicort) 160-4.5 mcg/actuation inhaler Inhale 2 puffs 2 times a day. Rinse mouth with water after use to reduce aftertaste and incidence of candidiasis. Do not swallow.      cetirizine (ZyrTEC) 10 mg tablet Take 1 tablet (10 mg) by mouth.      DULoxetine (Cymbalta) 60 mg DR capsule Take 1 capsule (60 mg) by mouth once daily.      Eliquis 5 mg tablet Take 0.5 tablets (2.5 mg) by mouth 2 times a day.      ergocalciferol (Vitamin D-2) 1.25 MG (44400 UT) capsule Take 1 capsule (1,250 mcg)  "by mouth 1 (one) time per week.      famotidine (Pepcid) 20 mg tablet Take 1 tablet (20 mg) by mouth twice a day.      losartan (Cozaar) 50 mg tablet Take 1 tablet (50 mg) by mouth 2 times a day.      metoprolol succinate XL (Toprol-XL) 25 mg 24 hr tablet Take 1 tablet (25 mg) by mouth once daily.      montelukast (Singulair) 10 mg tablet Take 1 tablet (10 mg) by mouth once daily.      Ozempic 0.25 mg or 0.5 mg (2 mg/3 mL) pen injector 0.25 mg 1 (one) time per week.      traMADol (Ultram) 50 mg tablet Take 1 tablet (50 mg) by mouth 2 times a day.         Allergies  Codeine, Dextromethorphan, Fluticasone propion-salmeterol, Lidocaine, Morphine, Other, Penicillins, and Sulfa (sulfonamide antibiotics)     Social History  She reports that she has never smoked. She has never used smokeless tobacco. She reports that she does not drink alcohol and does not use drugs.    Family History  No family history on file.     Review of Systems   Constitutional:  Negative for appetite change, fatigue and fever.   Respiratory:  Positive for cough. Negative for chest tightness and shortness of breath.    Cardiovascular:  Negative for chest pain and palpitations.   Gastrointestinal:  Positive for abdominal pain. Negative for diarrhea and nausea.   Musculoskeletal:  Positive for arthralgias. Negative for neck pain and neck stiffness.   Neurological:  Negative for dizziness, light-headedness and headaches.       Last Recorded Vitals  Blood pressure 98/51, pulse 93, temperature 36.9 °C (98.4 °F), resp. rate 20, height 1.676 m (5' 6\"), weight 125 kg (275 lb 2.2 oz), SpO2 95%.     Primary Survey  Airway: intact  Breathing: nonlabored   Breath Sounds: clear to auscultation bilaterally  Circulation:    Pulses: palpable bilateral radial, femoral, dorsalis pedis arteries   Skin: warm. Capillary refill <3s  Disability:   Pupils: equal, round and reactive to light   GCS:    Best Eyes: 4    Best Verbal: 5    Best Motor: 6    Total: 15     Secondary " Survey  Neurologic: sensation and motor function intact in bilateral upper and lower extremities  HEENT:   Head: no abrasions, lacerations or contusions. Skull intact. Midface stable to palpation   Eyes: equal round and reactive to light   Ears: no hemotympanum. No abrasions or lacerations   Nose: no abrasions or lacerations. No blood per nares   Oral Cavity: no blood noted. Dentition intact  Neck: soft, supple. Trachea midline. No abrasions, lacerations or contusions. No crepitus  Pulmonary: clear to auscultation bilaterally. External: no abrasions, lacerations or contusions. No crepitus  Cardiovascular:    Pulses: palpable bilateral radial, femoral, dorsalis pedis and posterior tibial arteries  Abdomen: soft, nondistended, nontender to palpation. No abrasions, lacerations or contusions, scar from prior open cholecystectomy  Musculoskeletal:    Back/Spine: nontender, no stepoffs. Back without abrasions, lacerations or contusions   Extremities: no abrasions, lacerations or contusions. No deformities or joint swelling     Relevant Results  Results for orders placed or performed during the hospital encounter of 12/21/24 (from the past 24 hours)   POCT GLUCOSE   Result Value Ref Range    POCT Glucose 158 (H) 74 - 99 mg/dL   CBC and Auto Differential   Result Value Ref Range    WBC 15.2 (H) 4.4 - 11.3 x10*3/uL    nRBC 0.0 0.0 - 0.0 /100 WBCs    RBC 4.37 4.00 - 5.20 x10*6/uL    Hemoglobin 11.9 (L) 12.0 - 16.0 g/dL    Hematocrit 36.3 36.0 - 46.0 %    MCV 83 80 - 100 fL    MCH 27.2 26.0 - 34.0 pg    MCHC 32.8 32.0 - 36.0 g/dL    RDW 13.7 11.5 - 14.5 %    Platelets 260 150 - 450 x10*3/uL    Neutrophils % 84.2 40.0 - 80.0 %    Immature Granulocytes %, Automated 0.4 0.0 - 0.9 %    Lymphocytes % 7.6 13.0 - 44.0 %    Monocytes % 7.6 2.0 - 10.0 %    Eosinophils % 0.1 0.0 - 6.0 %    Basophils % 0.1 0.0 - 2.0 %    Neutrophils Absolute 12.77 (H) 1.60 - 5.50 x10*3/uL    Immature Granulocytes Absolute, Automated 0.06 0.00 - 0.50  x10*3/uL    Lymphocytes Absolute 1.16 0.80 - 3.00 x10*3/uL    Monocytes Absolute 1.15 (H) 0.05 - 0.80 x10*3/uL    Eosinophils Absolute 0.01 0.00 - 0.40 x10*3/uL    Basophils Absolute 0.02 0.00 - 0.10 x10*3/uL   Magnesium   Result Value Ref Range    Magnesium 1.49 (L) 1.60 - 2.40 mg/dL   Comprehensive metabolic panel   Result Value Ref Range    Glucose 173 (H) 74 - 99 mg/dL    Sodium 139 136 - 145 mmol/L    Potassium 4.1 3.5 - 5.3 mmol/L    Chloride 107 98 - 107 mmol/L    Bicarbonate 22 21 - 32 mmol/L    Anion Gap 14 10 - 20 mmol/L    Urea Nitrogen 28 (H) 6 - 23 mg/dL    Creatinine 1.60 (H) 0.50 - 1.05 mg/dL    eGFR 34 (L) >60 mL/min/1.73m*2    Calcium 8.5 (L) 8.6 - 10.3 mg/dL    Albumin 3.6 3.4 - 5.0 g/dL    Alkaline Phosphatase 66 33 - 136 U/L    Total Protein 6.3 (L) 6.4 - 8.2 g/dL    AST 35 9 - 39 U/L    Bilirubin, Total 2.3 (H) 0.0 - 1.2 mg/dL    ALT 26 7 - 45 U/L   Lipase   Result Value Ref Range    Lipase 3 (L) 9 - 82 U/L   Influenza A, and B PCR   Result Value Ref Range    Flu A Result Not Detected Not Detected    Flu B Result Not Detected Not Detected   Sars-CoV-2 PCR   Result Value Ref Range    Coronavirus 2019, PCR Not Detected Not Detected   Troponin I, High Sensitivity   Result Value Ref Range    Troponin I, High Sensitivity 55 (HH) 0 - 13 ng/L   Troponin I, High Sensitivity   Result Value Ref Range    Troponin I, High Sensitivity 42 (H) 0 - 13 ng/L   Urinalysis with Reflex Culture and Microscopic   Result Value Ref Range    Color, Urine Dark-Yellow Light-Yellow, Yellow, Dark-Yellow    Appearance, Urine Ex.Turbid (N) Clear    Specific Gravity, Urine 1.025 1.005 - 1.035    pH, Urine 5.5 5.0, 5.5, 6.0, 6.5, 7.0, 7.5, 8.0    Protein, Urine 100 (2+) (A) NEGATIVE, 10 (TRACE), 20 (TRACE) mg/dL    Glucose, Urine Normal Normal mg/dL    Blood, Urine 1.0 (3+) (A) NEGATIVE    Ketones, Urine NEGATIVE NEGATIVE mg/dL    Bilirubin, Urine NEGATIVE NEGATIVE    Urobilinogen, Urine Normal Normal mg/dL    Nitrite, Urine 1+  (A) NEGATIVE    Leukocyte Esterase, Urine 500 Lucie/µL (A) NEGATIVE   Microscopic Only, Urine   Result Value Ref Range    WBC, Urine >50 (A) 1-5, NONE /HPF    RBC, Urine >20 (A) NONE, 1-2, 3-5 /HPF    Squamous Epithelial Cells, Urine 10-25 (FEW) Reference range not established. /HPF    Bacteria, Urine 1+ (A) NONE SEEN /HPF    Mucus, Urine FEW Reference range not established. /LPF       XR chest 1 view    Result Date: 12/21/2024  Interpreted By:  Mauri Kendall, STUDY: XR CHEST 1 VIEW   INDICATION: Signs/Symptoms:fall.   COMPARISON: August 16, 2016   ACCESSION NUMBER(S): [id number]   ORDERING CLINICIAN: KIRILL WILKINSON   FINDINGS: No consolidation, effusion, edema, pneumothorax. Mild cardiomegaly unchanged.       No evidence of acute intrathoracic abnormality.   Signed by: Mauri Kendall 12/21/2024 5:32 PM Dictation workstation:   [id number]    XR hip left with pelvis when performed 2 or 3 views    Result Date: 12/21/2024  Interpreted By:  Mauri Kendall, STUDY: XR HIP LEFT WITH PELVIS WHEN PERFORMED 2 OR 3 VIEWS   INDICATION: Signs/Symptoms:fall.   COMPARISON: October 30, 2017   ACCESSION NUMBER(S): [id number]   ORDERING CLINICIAN: KIRILL WLIKINSON   FINDINGS: Advanced osteoarthritis left hip.   No evidence of fracture or dislocation.       Advanced left hip osteoarthritis.   Signed by: Mauri Kendall 12/21/2024 5:32 PM Dictation workstation:   [id number]    CT head wo IV contrast    Result Date: 12/21/2024  Interpreted By:  Tray William, STUDY: CT HEAD WO IV CONTRAST; CT CERVICAL SPINE WO IV CONTRAST;  12/21/2024 4:52 pm   INDICATION: Signs/Symptoms:fall.     COMPARISON: CT head dated 03/25/2009.   ACCESSION NUMBER(S): [id number]; [id number]   ORDERING CLINICIAN: NEREIDA GUTIERREZ   TECHNIQUE: Noncontrast axial CT scan of head was performed, with coronal and sagittal reformats provided. The images were reviewed in bone, brain, blood and soft tissue windows.   Axial CT images of the cervical spine are obtained. Axial,  coronal and sagittal reconstructions are provided for review.   FINDINGS: CT HEAD:   No hyperdense intracranial hemorrhage is present. There is no mass effect or midline shift identified.   Gray-white differentiation is intact, without evidence of CT apparent transcortical infarct. Subtle attenuation changes in the periventricular white matter is nonspecific, but favored to represent sequela of microvascular disease.   No abnormal ventricular dilatation is present. Basal cisterns are patent. No extra-axial fluid collections are identified.   Scalp soft tissues do not demonstrate any acute abnormality. No skull fracture, or other acute calvarial abnormality is present.   Mastoid air cells and middle ear cavities are clear. Some frothy layering secretions are present in the left sphenoid and right maxillary sinus. Bony orbits appear preserved.   CT C-SPINE:   Surgical changes of ACDF of C5-C6 are present, with the associated beam hardening artifact somewhat limiting evaluation of the adjacent structures.   There is reversal normal lordotic curvature of the cervical spine, without evidence of significant spondylolisthesis.   Cervical vertebral body heights are preserved, without evidence of compression fractures. Posterior elements of the cervical spine do not demonstrate any evidence of acute trauma.   Craniocervical junction is intact, with a advanced degenerative changes present at the atlantoaxial articulation, with the associated heterotopic calcifications/osteophytosis. Posterior elements of the cervical spine are preserved without evidence of traumatic subluxation or widening, although multilevel degenerate facet osteoarthropathy is present, with bony ankylosis of the articular processes of C2-C3 and C3-C4.   Multilevel intervertebral disc height loss is present, moderate to severe at the levels of C4-C5, C5-C6, and C6-C7. Moderate stenosis is present at C7 T1.   No high-grade stenosis is identified, although  mild-to-moderate narrowing is suspected at the levels of C4-C5 due to disc osteophyte complex and ligamentum flavum thickening.   Multilevel neural foraminal stenosis is present, with likely moderate if not severe narrowing suspected at C4-C5 and C5-C6 bilaterally due to hypertrophic uncovertebral and facet joint changes.   Prevertebral and paraspinal soft tissues do not demonstrate any acute abnormality.       CT HEAD: 1. No evidence of hemorrhage, skull fracture, or other acute intracranial abnormality. 2. Subtle attenuation changes in the periventricular white matter are nonspecific, but favored to represent sequela of microvascular disease.   CT C-SPINE: 1. Postsurgical changes of ACDF of C5-C6, without evidence of acute trauma to the cervical spine. 2. Spondylotic changes of the cervical spine as detailed above.   MACRO: None   Signed by: Tray William 12/21/2024 5:30 PM Dictation workstation:   [id number]    CT cervical spine wo IV contrast    Result Date: 12/21/2024  Interpreted By:  Tray William, STUDY: CT HEAD WO IV CONTRAST; CT CERVICAL SPINE WO IV CONTRAST;  12/21/2024 4:52 pm   INDICATION: Signs/Symptoms:fall.     COMPARISON: CT head dated 03/25/2009.   ACCESSION NUMBER(S): [id number]; [id number]   ORDERING CLINICIAN: NEREIDA GUTIERREZ   TECHNIQUE: Noncontrast axial CT scan of head was performed, with coronal and sagittal reformats provided. The images were reviewed in bone, brain, blood and soft tissue windows.   Axial CT images of the cervical spine are obtained. Axial, coronal and sagittal reconstructions are provided for review.   FINDINGS: CT HEAD:   No hyperdense intracranial hemorrhage is present. There is no mass effect or midline shift identified.   Gray-white differentiation is intact, without evidence of CT apparent transcortical infarct. Subtle attenuation changes in the periventricular white matter is nonspecific, but favored to represent sequela of microvascular  disease.   No abnormal ventricular dilatation is present. Basal cisterns are patent. No extra-axial fluid collections are identified.   Scalp soft tissues do not demonstrate any acute abnormality. No skull fracture, or other acute calvarial abnormality is present.   Mastoid air cells and middle ear cavities are clear. Some frothy layering secretions are present in the left sphenoid and right maxillary sinus. Bony orbits appear preserved.   CT C-SPINE:   Surgical changes of ACDF of C5-C6 are present, with the associated beam hardening artifact somewhat limiting evaluation of the adjacent structures.   There is reversal normal lordotic curvature of the cervical spine, without evidence of significant spondylolisthesis.   Cervical vertebral body heights are preserved, without evidence of compression fractures. Posterior elements of the cervical spine do not demonstrate any evidence of acute trauma.   Craniocervical junction is intact, with a advanced degenerative changes present at the atlantoaxial articulation, with the associated heterotopic calcifications/osteophytosis. Posterior elements of the cervical spine are preserved without evidence of traumatic subluxation or widening, although multilevel degenerate facet osteoarthropathy is present, with bony ankylosis of the articular processes of C2-C3 and C3-C4.   Multilevel intervertebral disc height loss is present, moderate to severe at the levels of C4-C5, C5-C6, and C6-C7. Moderate stenosis is present at C7 T1.   No high-grade stenosis is identified, although mild-to-moderate narrowing is suspected at the levels of C4-C5 due to disc osteophyte complex and ligamentum flavum thickening.   Multilevel neural foraminal stenosis is present, with likely moderate if not severe narrowing suspected at C4-C5 and C5-C6 bilaterally due to hypertrophic uncovertebral and facet joint changes.   Prevertebral and paraspinal soft tissues do not demonstrate any acute abnormality.        CT HEAD: 1. No evidence of hemorrhage, skull fracture, or other acute intracranial abnormality. 2. Subtle attenuation changes in the periventricular white matter are nonspecific, but favored to represent sequela of microvascular disease.   CT C-SPINE: 1. Postsurgical changes of ACDF of C5-C6, without evidence of acute trauma to the cervical spine. 2. Spondylotic changes of the cervical spine as detailed above.   MACRO: None   Signed by: Tray William 12/21/2024 5:30 PM Dictation workstation:   [id number]    Transthoracic Echo Complete    Result Date: 12/17/2024              [place] [address]      Phone [phone] Fax [phone] TRANSTHORACIC ECHOCARDIOGRAM REPORT Patient Name:       JASON HAM     Reading Physician:    Ash Bishop MD Study Date:         12/16/2024          Ordering Provider:    Ash BISHOP MRN/PID:            [id number]            Fellow: Accession#:         [id number]        Nurse:                Astrid Goodwin Date of Birth/Age:  1950 / 74      Sonographer:          Jamaica landers                                     [facility] Gender Assigned at  F                   Additional Staff: Birth: Height:             167.64 cm           Admit Date: Weight:             126.55 kg           Admission Status:     Outpatient BSA / BMI:          2.30 m2 / 45.03     Department Location:  [facility] Echo                     kg/m2                                     Lab Blood Pressure: 128 /60 mmHg Study Type:    TRANSTHORACIC ECHO (TTE) COMPLETE Diagnosis/ICD: Shortness of breath-R06.02; Essential (primary) hypertension-I10 Indication:    SHORTNESS OF BREATH, HTN CPT Codes:     Echo Complete w Full Doppler-55241 Patient History: Pertinent History: HTN and  Hyperlipidemia. Study Detail: The following Echo studies were performed: 2D, M-Mode, Doppler and               color flow. Technically challenging study due to prominent lung               artifact and body habitus. Definity used as a contrast agent for               endocardial border definition. Total contrast used for this               procedure was 2 mL via IV push.  PHYSICIAN INTERPRETATION: Left Ventricle: The left ventricular systolic function is normal, with a Alvarez's biplane calculated ejection fraction of 59%. There are no regional wall motion abnormalities. The left ventricular cavity size is normal. There is mild increased septal thickness. Spectral Doppler shows a normal pattern of left ventricular diastolic filling. Left Atrium: The left atrium is normal in size. Right Ventricle: The right ventricle is normal in size. There is normal right ventricular global systolic function. Right Atrium: The right atrium is normal in size. Aortic Valve: The aortic valve was not well visualized. The aortic valve dimensionless index is 0.54. There is no evidence of aortic valve regurgitation. The peak instantaneous gradient of the aortic valve is 16 mmHg. The mean gradient of the aortic valve is 8 mmHg. Mitral Valve: The mitral valve is normal in structure. There is mild mitral annular calcification. There is trace mitral valve regurgitation. Tricuspid Valve: The tricuspid valve is structurally normal. There is trace tricuspid regurgitation. Pulmonic Valve: The pulmonic valve is not well visualized. There is no indication of pulmonic valve regurgitation. Pericardium: No pericardial effusion noted. Aorta: The aortic root is normal. Systemic Veins: The inferior vena cava appears normal in size, with IVC inspiratory collapse greater than 50%.  CONCLUSIONS:  1. The left ventricular systolic function is normal, with a Alvarez's biplane calculated ejection fraction of 59%.  2. There is normal right ventricular global  systolic function. QUANTITATIVE DATA SUMMARY:  2D MEASUREMENTS:           Normal Ranges: Ao Root d:       2.60 cm   (2.0-3.7cm) LAs:             3.80 cm   (2.7-4.0cm) IVSd:            1.20 cm   (0.6-1.1cm) LVPWd:           0.94 cm   (0.6-1.1cm) LVIDd:           4.46 cm   (3.9-5.9cm) LVIDs:           2.61 cm LV Mass Index:   72.3 g/m2 LV % FS          41.5 %  LA VOLUME:                    Normal Ranges: LA Vol A4C:        23.3 ml    (22+/-6mL/m2) LA Vol A2C:        24.1 ml LA Vol BP:         24.4 ml LA Vol Index A4C:  10.1ml/m2 LA Vol Index A2C:  10.4 ml/m2 LA Vol Index BP:   10.6 ml/m2 LA Area A4C:       12.4 cm2 LA Area A2C:       12.2 cm2 LA Major Axis A4C: 5.6 cm LA Major Axis A2C: 5.3 cm LA Volume Index:   10.6 ml/m2  RA VOLUME BY A/L METHOD:          Normal Ranges: RA Area A4C:             10.1 cm2  AORTA MEASUREMENTS:         Normal Ranges: Ao Sinus, d:        2.60 cm (2.1-3.5cm) Ao STJ, d:          2.50 cm (1.7-3.4cm) Asc Ao, d:          2.70 cm (2.1-3.4cm)  LV SYSTOLIC FUNCTION BY 2D PLANIMETRY (MOD):                      Normal Ranges: EF-A4C View:    57 % (>=55%) EF-A2C View:    61 % EF-Biplane:     59 % LV EF Reported: 59 %  LV DIASTOLIC FUNCTION:             Normal Ranges: MV Peak E:             0.54 m/s    (0.7-1.2 m/s) MV Peak A:             0.79 m/s    (0.42-0.7 m/s) E/A Ratio:             0.69        (1.0-2.2) MV e'                  0.078 m/s   (>8.0) MV lateral e'          0.09 m/s MV medial e'           0.06 m/s MV A Dur:              108.00 msec E/e' Ratio:            6.95        (<8.0)  MITRAL VALVE:          Normal Ranges: MV DT:        213 msec (150-240msec)  AORTIC VALVE:                      Normal Ranges: AoV Vmax:                1.98 m/s  (<=1.7m/s) AoV Peak PG:             15.7 mmHg (<20mmHg) AoV Mean P.0 mmHg  (1.7-11.5mmHg) LVOT Max Mathieu:            1.08 m/s  (<=1.1m/s) AoV VTI:                 34.20 cm  (18-25cm) LVOT VTI:                18.60 cm LVOT Diameter:            2.10 cm   (1.8-2.4cm) AoV Area, VTI:           1.88 cm2  (2.5-5.5cm2) AoV Area,Vmax:           1.89 cm2  (2.5-4.5cm2) AoV Dimensionless Index: 0.54  RIGHT VENTRICLE: RV Basal 3.17 cm RV Mid   2.48 cm RV Major 6.4 cm TAPSE:   20.9 mm RV s'    0.13 m/s  TRICUSPID VALVE/RVSP:          Normal Ranges: Peak TR Velocity:     2.70 m/s Est. RA Pressure:     3 mmHg RV Syst Pressure:     32 mmHg  (< 30mmHg) IVC Diam:             1.14 cm  [id number] Bishop MD Electronically signed on 12/17/2024 at 12:49:22 PM  ** Final **     Echocardiogram Stress Test    Result Date: 12/17/2024              [place] [address]      Phone [phone] Fax [phone] Dobutamine Stress Echo Patient Name:      JASON HAM      Ordering Provider:     [id number] BISHOP Study Date:        12/16/2024           Reading Physician:     Ash Bishop MD MRN/PID:           [id number]             Supervising Physician: Ash Bishop MD Accession#:        [id number]         Fellow: Date of Birth/Age: 1950 / 74 years Fellow: Gender:            F                    Nurse:                 Astrid Goodwin Admission Status:  Outpatient           Sonographer:           Jamaica Rolon RDCS Height:            167.00 cm            Technologist: Weight:            127.00 kg            Additional Staff: BSA:               2.30 m2              Encounter#:            [id number] BMI:               45.54 kg/m2          Patient Location:      [facility] Study Type:    ECHOCARDIOGRAM STRESS TEST Diagnosis/ICD: Shortness of breath-R06.02 Indication:    Dyspnea CPT Codes:     Stress Echo-93518; Stress Test Interpretation-93957; Stress  Test                Supervision-[id number] Falls Risk:  Study Details: Correct procedure and correct patient verified verbally and with                ID Band checked.  Patient History: Hyperlipidemia, hypertension, chronic lung disease, dyspnea, previous deep vein thrombosis and diastolic dysfunction. Allergies: Multiple - see chart. Diabetes:  Yes.  Medications: Baclofen, Cymbalta, Eliquis, Famotidine, Losartan, Metoprolol, Ozempic.  Patient Performance: The patient received 40 mcg/kg/min of Dobutamine and a total dose of 0.25 mg of atropine in divided doses of 0.25 mg at 10:29:51 AM. The peak heart rate achieved was 141 bpm, which was 96 % of the age predicted target heart rate of 146 bpm. The resting blood pressure was 137/67 mmHg with a heart rate of 87 bpm. The patient developed no symptoms during the stress exam. The blood pressure response was hypertensive. The test was terminated due to: achieved age predicted maximum heart rate. Patient has met the discharge criteria and is discharged to home.  Baseline ECG: Resting ECG showed normal sinus rhythm with nonspecific ST-T wave changes.  Stress ECG: Stress ECG showed sinus tachycardia, with occ PAC. No ST changes.  Stress Stage Data: +----------------+---+------+-------+-------------+                 HR Sys BPDias BPComments      +----------------+---+------+-------+-------------+ Baseline [id number] 137   67                   +----------------+---+------+-------+-------------+ Stage I         86 151   70     5 mcg/kg/min  +----------------+---+------+-------+-------------+ Stage II        93 168   66     10 mcg/kg/min +----------------+---+------+-------+-------------+ Stage III       [id number]   47     20 mcg/kg/min +----------------+---+------+-------+-------------+ Stage IV        [id number]   40     30 mcg/kg/min +----------------+---+------+-------+-------------+  Recovery ECG: Recovery ECG showed normal sinus rhythm, with occ PAC.  The heart rate recovery was normal.  +------------+---+------+-------+             HR Sys BPDias BP +------------+---+------+-------+ Recovery I  [id number]   59      +------------+---+------+-------+ Recovery [id number]           +------------+---+------+-------+ Recovery V  [id number]   68      +------------+---+------+-------+ Recovery VII92 128   64      +------------+---+------+-------+  Baseline Echo: Definity used as a contrast agent for endocardial border definition. Total contrast used for this procedure was 2 mL via IV push. Normal left ventricular size and function. There are no regional wall motion abnormalities at baseline.  Stress Echo: Normal left ventricular size and function during peak stress. There are no stress induced regional wall motion abnormalities.  Summary:  1. Adequate level of stress achieved.  2. No clinical, echocardiographic or electrocardiographic evidence for ischemia at maximal infusion. [id number] Shena Mazariegos MD Electronically signed on 12/17/2024 at 11:12:52 AM   ** Final **     XR chest 2 views    Result Date: 12/2/2024  * * *Final Report* * * DATE OF EXAM: Dec  2 2024 12:29PM   BNX   [id number]  -  XR CHEST 2V FRONTAL/LAT  / ACCESSION #  [id number] PROCEDURE REASON: Acute cough      * * * * Physician Interpretation * * * *  EXAMINATION:  CHEST RADIOGRAPH (2 VIEW FRONTAL & LATERAL) CLINICAL HISTORY: Acute cough MQ:  [id number]_6 EXAM DATE/TIME:  12/2/2024 12:29 PM COMPARISON:  04/03/2024 RESULT: Lines, tubes, and devices:  None. Lungs and pleura:  No consolidation. No lung mass. No pleural effusion. No pneumothorax. Cardiomediastinal silhouette:  Normal cardiomediastinal silhouette. Bones and soft tissues:  Metallic clips project over the right upper quadrant.    IMPRESSION: No acute radiographic abnormality. : LLOYD   Transcribe Date/Time: Dec  2 2024 12:48P Dictated by : CARMINE ELIZABETH MD This examination was interpreted and the report reviewed and  electronically signed by: CARMINE ELIZABETH MD on Dec  2 2024 12:49PM  EST      Assessment and Plan  Assessment & Plan    74 y.o. female with fall and head trauma on eliquis  - No traumatic injuries noted on tertiary exam  - Evidence of SHANTANU on labs as well as UTI on UA  - Medical management per primary team  - OK to continue eliquis  - reach out with any further question or concerns      Wdw Dr. Gustavo Borja, PGY2  General Surgery       Pt seen and examined at 8 am 12/22 no traumatic issues identified will sign off.

## 2024-12-22 NOTE — PROGRESS NOTES
Mary Dao is a 74 y.o. female on day 1 of admission presenting with Cystitis.    Subjective   Mary Dao is a 74 y.o. female with PMHx s/f HTN, HLD, IST, asthma, allergic rhinitis, prior DVT on Eliquis (2.5mg BID), T2DM, OA, cLBP, h/o cervical cord compression s/p C5-6 ACDF (04/19/24 - [facility]), chronic gait instability (with use of walker at home), presenting after a fall at home. Pt reports that she was at home and trying to reach for something, falling out of her recliner/chair ~0200 morning of admission (12/21/24). She did hit her head, she did not lose consciousness. This was unwitnessed. She was on the ground for quite some time; was unable to get up by herself d/t chronic hip pain. Notably, she had RSV followed by persistent symptoms (was treated for either the flu or possible PNA --? was started on doxycycline), but has been feeling overall unwell. Has had nausea, a few episodes of NBNB emesis (since she initially became sick), has had poor appetite and intake, and just feels overall poor. She has had some generalized abdominal discomfort. Denies cp/pressure, palpitations, diaphoresis, SOB, SEAMAN, dizziness / lightheadedness, syncope or near syncope, HA, vision changes, fever, chills, focal and/or lateralizing sensory or motor deficits.       ED Course (Summary - please note all labs, imaging studies, and interventions noted below have been personally reviewed and/or interpreted on day of admission):   Vitals on presentation: T98.4, /54, HR 89, RR 20, SpO2 96% RA  Labs: CBC with WBC 15.3 (neutrophil predominance), Hgb 11.9, platelets 260.  CMP with glucose 173, sodium 139, potassium 4.1, BUN 28, serum creatinine 1.60, total bili 2.3.  Mag 1.49.  High-sensitivity troponin 55-42.  Flu, COVID-negative.  UA: 1+ nitrates, 500 leukocyte esterase, >50 WBCs, 1+ bacteria; urine cultures in progress.  EKG: Sinus rhythm, no overt ST-T changes indicative of acute ischemia  Imaging: CT head and C-spine  "negative for acute process; postsurgical changes noted to the C-spine.  XR of the left hip negative for acute process but does note advanced osteoarthritis.  CXR negative for acute cardiopulmonary process.  Interventions: Ceftriaxone 2 g x 1, 1 L normal saline bolus, trauma consultation, admitted to medicine    12/22: No acute complaints. Still sore. She was febrile overnight.          Objective     Constitutional: Pleasant and cooperative. Laying in bed in no acute distress. Conversant.   Skin: Warm and dry; no obvious lesions, rashes, pallor, or jaundice.   Eyes: EOMI. Anicteric sclera.   ENT: Mucous membranes slightly dry; no obvious injury or deformity appreciated.   Head and Neck: Normocephalic, atraumatic. ROM preserved. Trachea midline. No appreciable JVD.   Respiratory: Nonlabored on RA. Lungs clear to auscultation bilaterally without obvious adventitious sounds. Chest rise is equal.  Cardiovascular: RRR. No gross murmur, gallop, or rub. Extremities are warm and well-perfused with good capillary refill (< 3 seconds). No chest wall tenderness.   GI: Abdomen soft, nontender, nondistended. No obvious organomegaly appreciated. Prior surgical scar well-appearing.   : No CVA tenderness.   MSK: No gross abnormalities appreciated. No limitations to AROM/PROM appreciated. Global weakness.   Extremities: No cyanosis, edema, or clubbing evident. Neurovascularly intact.   Neuro: A&Ox3. CN 2-12 grossly intact. Able to respond to questions appropriately and clearly. No acute focal neurologic deficits appreciated.  Psych: Appropriate mood and behavior.    Last Recorded Vitals  Blood pressure 128/79, pulse 62, temperature 36.1 °C (96.9 °F), temperature source Temporal, resp. rate 18, height 1.676 m (5' 6\"), weight 123 kg (271 lb 12.8 oz), SpO2 94%.  Intake/Output last 3 Shifts:  I/O last 3 completed shifts:  In: 1302.7 (10.6 mL/kg) [P.O.:591; I.V.:711.7 (5.8 mL/kg)]  Out: - (0 mL/kg)   Weight: 123.3 kg     Relevant " Results            This patient currently has cardiac telemetry ordered; if you would like to modify or discontinue the telemetry order, click here to go to the orders activity to modify/discontinue the order.                 Assessment/Plan   Sepsis without shock 2/2 UTI  -SIRS criteria (2/4): WBCs, HR   -Source: as above  -End-organ dysfunction: SHANTANU  -Lactate ordered, pending  -BP is low-normal, s/p 1L NS. Will continue on IVF; appears a little dry, have room to give another bolus if need be  -Blood cultures x2 in process. Urine culture in process.   -Follow fever curve, WBCs  -Continue antibiotic coverage with ceftriaxone D2     Acute Kidney Injury  -Baseline serum creatinine: ~0.5-0.6  -Creatinine at admission: 1.60, improving with volume resuscitation  -Suspect secondary to decreased oral intake (nausea), and episodes of emesis, home antihypertensives, mild rhabdomyolysis   -continued on IVF  -Hold home ARB for now   -Recheck renal function panel in AM   -Nephrology consultation if not improving, appreciate further recommendations      Rhabdomyolysis 2/2 prolonged down-time after fall at home   -Continue on IVF overnight  -Recheck in AM     Elevated troponin, suspect demand ischemia   -Pt with HSTI 55 --> 42   -No CP or anginal equivalents   -No acute ischemic changes on EKG   -Just had negative cardiac stress testing with Dr. Mazariegos on 12/18/24   -Continue home therapies. Monitor on tele.   -Suspect demand-mediated from sepsis, SHANTANU.      Accidental fall in home with head injury, no LOC, on Eliquis   -No prodromal symptoms   -No traumatic injuries on imaging   -Trauma consultation appreciated   -Fall precautions, PT/OT     Hypomagnesemia   -replete PRN     HTN, HLD   -Hold home Losartan with SHANTANU   -OK to cautiously resume home metop with hold parameters   -Continue to monitor closely      IST   -OK to cautiously resume home metop with hold parameters   -CV: Dr. Mazariegos      Asthma without acute  exacerbation   (+)hx recent RSV  -Continue home inhaler   -Pulm hygiene / supportive care      DM-II   -Hold home oral meds for now   -Continue with SSI ACHS   -Accucheks, hypoglycemic protocol   -Monitor and adjust as needed       H/o DVT   -Continue Eliquis      OA, cLBP  h/o cervical cord compression s/p C5-6 ACDF (04/19/24 - [facility])  -Continue any/all home therapies as appropriate   -PT/OT  -Pain control   -Continue outpatient follow-up with [facility] as scheduled      Acute on chronic gait instability, physical deconditioning   -PT/OT  -Fall precautions   -SW/CM consultations appreciated      Morbid Obesity (BMI 44.41)   -Patient does not meet morbid/severe criteria for obesity on admission   -Continued outpatient follow-up with PCP, healthy dietary and lifestyle changes as able    -Is working on weight loss; notably has been on ozempic outpatient; ?contribution to poor appetite and intake       Yg Malhotra MD PhD

## 2024-12-22 NOTE — PROGRESS NOTES
Physical Therapy    Physical Therapy Evaluation    Patient Name: Mary Dao  MRN: [id number]  Today's Date: 12/23/2024   Time Calculation  Start Time: 1436  Stop Time: 1458  Time Calculation (min): 22 min  2022/2022-A    Assessment/Plan   PT Assessment  PT Assessment Results: Decreased strength, Impaired balance, Decreased mobility, Decreased coordination, Decreased cognition, Impaired judgement, Decreased safety awareness, Obesity, Pain  Rehab Prognosis: Fair  Evaluation/Treatment Tolerance:  (tx limited by altered mental status)  Medical Staff Made Aware: Yes  End of Session Communication: Bedside nurse  Assessment Comment: Recommend MOD INTENSITY REHAB d/t inability to stand/transfer/amb  End of Session Patient Position: Bed, 3 rail up, Alarm on  IP OR SWING BED PT PLAN  Inpatient or Swing Bed: Inpatient  PT Plan  Treatment/Interventions: Bed mobility, Transfer training, Gait training, Neuromuscular re-education, Therapeutic exercise, Therapeutic activity  PT Plan: Ongoing PT  PT Frequency: 4 times per week  PT Discharge Recommendations: Low intensity level of continued care (ADDENDUM; recomm changed from MOD to LOW on 12/23 for rapid improvement mobility)  PT Recommended Transfer Status: Assist x2, Total assist  PT - OK to Discharge: Yes    Subjective     Current Problem:  1. Cystitis        2. Acute kidney injury (CMS-HCC)        3. Elevated troponin        4. Hypomagnesemia          General Visit Information:  General  Reason for Referral: pt feel trying to get up from recliner chair 2am, could not get up from floor, downtime. XR L hip done (-) fx.  Referred By: PT FOR IMPAIRED MOBILITY/GAIT TRAINING  Past Medical History Relevant to Rehab: HTN, HLD, IST, asthma, allergic rhinitis, prior DVT on Eliquis (2.5mg BID), T2DM, OA, cLBP, h/o cervical cord compression s/p C5-6 ACDF (04/19/24 - [facility]), chronic gait instability (with use of walker at home),  Family/Caregiver Present: No  Co-Treatment: OT  Co-Treatment  Reason: optimize pt safety while focus on discipline specific goals  Prior to Session Communication: Bedside nurse  Patient Position Received: Bed, 3 rail up, Alarm on  General Comment: pt seen in 2022, questioning reveals confusion, pt has NO insight into deficits    Home Living:  Home Living  Type of Home: House  Lives With: Adult children (dtr Lisa)  Home Adaptive Equipment: Walker rolling or standard  Home Layout: One level  Home Access: Stairs to enter without rails  Entrance Stairs-Number of Steps: 1    Prior Level of Function:  Prior Function Per Pt/Caregiver Report  Level of [place]: Needs assistance with ADLs, Needs assistance with homemaking  Ambulatory Assistance: Needs assistance  Transfers: Assistive device  Gait: Assistive device (uses ROLLATOR  at all times)    Precautions:  Precautions  Medical Precautions: Fall precautions  Precautions Comment: Falls     Objective     Pain:  Pain Assessment  Pain Assessment: 0-10  0-10 (Numeric) Pain Score:  (uabble to rate, restless)  Pain Type: Chronic pain  Pain Location: Leg  Pain Orientation:  (knee, hip--inconsistent, describes LLE fx x7 yrs ago but could not remember wher)  Pain Interventions: Rest    Cognition:  Cognition  Overall Cognitive Status: Impaired  Orientation Level: Disoriented to place, Disoriented to time, Disoriented to situation  Insight: Severe  Processing Speed: Delayed    General Assessments:  General Observation  General Observation: transfer supine to dangle EOB, worked on sitting balance and self righting for  at least 5 min but pt having much difficulty following commands and exhibiting unsfe behaviors: scooting dangerously close to EOB despite VC to not do so, retropulsion (kicking legs up off floor) and letting go of supports repeatedly. attempted to scoot laterally to HOB but mimimallysuccessful d/t both confusion and body habitus. RTB with coaching for self A scooting   Activity Tolerance  Endurance: Decreased tolerance for  upright activites           Coordination  Movements are Fluid and Coordinated: No  Upper Body Coordination: had much difficulty maintaining  with EITHER HAND on bedrails/walker/grab bar, appearedmore d/t confusion than weakness (could grasp strongly but then lets go and unable to use functionally)  Postural Control  Postural Control: Impaired  Trunk Control: retropulsion of trunk, kicking feet up off floor  Dynamic Sitting Balance  Dynamic Sitting-Comments: poor: retropulsion, poor ability to use UE for self righting       Functional Assessments:     Bed Mobility 1  Bed Mobility 1: Supine to sitting  Level of Assistance 1: +2, Maximum assistance, Maximum verbal cues, Maximum tactile cues  Bed Mobility 2  Bed Mobility  2: Sitting to supine  Level of Assistance 2: Maximum assistance, +2, Maximum verbal cues, Maximum tactile cues  Bed Mobility 3  Bed Mobility 3: Scooting  Level of Assistance 3: Maximum assistance, +2, Maximum verbal cues, Maximum tactile cues  Bed Mobility Comments 3: instructed bridging tech  Transfers  Transfer: No (unsafe to attempt d/t retropulsion and not following commands well)  Ambulation/Gait Training  Ambulation/Gait Training Performed: No  Stairs  Stairs: No       Extremity/Trunk Assessments:        RLE   RLE : Exceptions to WFL  AROM RLE (degrees)  RLE AROM Comment: WFL  Strength RLE  RLE Overall Strength: Greater than or equal to 3/5 as evidenced by functional mobility, Unable to assess, Due to cognitive deficits  LLE   LLE : Exceptions to WFL  AROM LLE (degrees)  LLE AROM Comment: WFL  Strength LLE  LLE Overall Strength: Unable to assess, Due to cognitive deficits, Greater than or equal to 3/5 as evidenced by functional mobility    Outcome Measures:             Goals:  Encounter Problems       Encounter Problems (Active)       Balance       pt will maintain upright sitting balance unsupported (no retropulsion) and keep feet on floor for safe transition to standing (Progressing)        Start:  12/22/24    Expected End:  01/05/25               PT Transfers       STG - Patient will demonstrate safe transfer techniques bed <>chair/BSC until able to safely stand/amb (Progressing)       Start:  12/22/24    Expected End:  01/05/25            STG - Patient will transfer sit to and from stand with no more than 1 person assist (Progressing)       Start:  12/22/24    Expected End:  01/05/25               Pain - Adult            Education Documentation  No documentation found.  Education Comments  No comments found.

## 2024-12-22 NOTE — CARE PLAN
Problem: Pain - Adult  Goal: Verbalizes/displays adequate comfort level or baseline comfort level  Outcome: Progressing     Problem: Safety - Adult  Goal: Free from fall injury  Outcome: Progressing     Problem: Discharge Planning  Goal: Discharge to home or other facility with appropriate resources  Outcome: Progressing     Problem: Chronic Conditions and Co-morbidities  Goal: Patient's chronic conditions and co-morbidity symptoms are monitored and maintained or improved  Outcome: Progressing     Problem: Skin  Goal: Decreased wound size/increased tissue granulation at next dressing change  Outcome: Progressing  Flowsheets (Taken 12/22/2024 0202)  Decreased wound size/increased tissue granulation at next dressing change: Promote sleep for wound healing  Goal: Participates in plan/prevention/treatment measures  Outcome: Progressing  Flowsheets (Taken 12/22/2024 0202)  Participates in plan/prevention/treatment measures: Elevate heels  Goal: Prevent/manage excess moisture  Outcome: Progressing  Flowsheets (Taken 12/22/2024 0202)  Prevent/manage excess moisture: Cleanse incontinence/protect with barrier cream  Goal: Prevent/minimize sheer/friction injuries  Outcome: Progressing  Flowsheets (Taken 12/22/2024 0202)  Prevent/minimize sheer/friction injuries: HOB 30 degrees or less  Goal: Promote/optimize nutrition  Outcome: Progressing  Flowsheets (Taken 12/22/2024 0202)  Promote/optimize nutrition: Monitor/record intake including meals  Goal: Promote skin healing  Outcome: Progressing  Flowsheets (Taken 12/22/2024 0202)  Promote skin healing: Assess skin/pad under line(s)/device(s)

## 2024-12-22 NOTE — PROGRESS NOTES
Mary Dao is a 74 y.o. female admitted for Cystitis. Pharmacy reviewed the patient's afojw-cu-albryivle medications and allergies for accuracy.    The list below reflects the PTA list prior to pharmacy medication history. A summary a changes to the PTA medication list has been listed below. Please review each medication in order reconciliation for additional clarification and justification.    Source of information: t2p    Medications added:  Albuterol nebulizer solution - 1 q 6 h prn   Tessalon 100mg capsule- 1 tid prn cough   Zofran odt 4mg - 1 q 8 h prn     Medications modified:  Eliquis 5mg .5 tablet bid --> 1 t bid   Losartan 50mg 1 bid --> 25mg 1 qam    Medications to be removed:  Metoprolol succinate 25mg     Medications of concern:      Prior to Admission Medications   Prescriptions Last Dose Informant Patient Reported? Taking?   DULoxetine (Cymbalta) 60 mg DR capsule   Yes No   Sig: Take 1 capsule (60 mg) by mouth once daily.   Eliquis 5 mg tablet   Yes No   Sig: Take 0.5 tablets (2.5 mg) by mouth 2 times a day.   Ozempic 0.25 mg or 0.5 mg (2 mg/3 mL) pen injector   Yes No   Si.25 mg 1 (one) time per week.   albuterol 90 mcg/actuation inhaler   Yes No   Sig: Inhale 2 puffs every 4 hours if needed.   baclofen (Lioresal) 10 mg tablet   Yes No   Sig: Take 1 tablet (10 mg) by mouth once daily.   budesonide-formoteroL (Symbicort) 160-4.5 mcg/actuation inhaler   Yes No   Sig: Inhale 2 puffs 2 times a day. Rinse mouth with water after use to reduce aftertaste and incidence of candidiasis. Do not swallow.   cetirizine (ZyrTEC) 10 mg tablet   Yes No   Sig: Take 1 tablet (10 mg) by mouth.   ergocalciferol (Vitamin D-2) 1.25 MG (94974 UT) capsule   Yes No   Sig: Take 1 capsule (1,250 mcg) by mouth 1 (one) time per week.   famotidine (Pepcid) 20 mg tablet   Yes No   Sig: Take 1 tablet (20 mg) by mouth twice a day.   losartan (Cozaar) 50 mg tablet   Yes No   Sig: Take 1 tablet (50 mg) by mouth 2 times a day.    metoprolol succinate XL (Toprol-XL) 25 mg 24 hr tablet   Yes No   Sig: Take 1 tablet (25 mg) by mouth once daily.   metoprolol tartrate (Lopressor) 25 mg tablet   Yes Yes   Sig: Take 1 tablet (25 mg) by mouth 2 times a day.   montelukast (Singulair) 10 mg tablet   Yes No   Sig: Take 1 tablet (10 mg) by mouth once daily.   traMADol (Ultram) 50 mg tablet   Yes No   Sig: Take 1 tablet (50 mg) by mouth 2 times a day.      Facility-Administered Medications: None       DAMIAN GALVAN

## 2024-12-22 NOTE — H&P
[facility] - GENERAL MEDICINE HISTORY AND PHYSICAL    History Obtained From (Primary Source): Patient  Collateral History (Secondary Sources): D/w ED    History Of Present Illness (HPI):  Mary Dao is a 74 y.o. female with PMHx s/f HTN, HLD, IST, asthma, allergic rhinitis, prior DVT on Eliquis (2.5mg BID), T2DM, OA, cLBP, h/o cervical cord compression s/p C5-6 ACDF (04/19/24 - [facility]), chronic gait instability (with use of walker at home), presenting after a fall at home. Pt reports that she was at home and trying to reach for something, falling out of her recliner/chair ~0200 morning of admission (12/21/24). She did hit her head, she did not lose consciousness. This was unwitnessed. She was on the ground for quite some time; was unable to get up by herself d/t chronic hip pain. Notably, she had RSV followed by persistent symptoms (was treated for either the flu or possible PNA --? was started on doxycycline), but has been feeling overall unwell. Has had nausea, a few episodes of NBNB emesis (since she initially became sick), has had poor appetite and intake, and just feels overall poor. She has had some generalized abdominal discomfort. Denies cp/pressure, palpitations, diaphoresis, SOB, SEAMAN, dizziness / lightheadedness, syncope or near syncope, HA, vision changes, fever, chills, focal and/or lateralizing sensory or motor deficits.      ED Course (Summary - please note all labs, imaging studies, and interventions noted below have been personally reviewed and/or interpreted on day of admission):   Vitals on presentation: T98.4, /54, HR 89, RR 20, SpO2 96% RA  Labs: CBC with WBC 15.3 (neutrophil predominance), Hgb 11.9, platelets 260.  CMP with glucose 173, sodium 139, potassium 4.1, BUN 28, serum creatinine 1.60, total bili 2.3.  Mag 1.49.  High-sensitivity troponin 55-42.  Flu, COVID-negative.  UA: 1+ nitrates, 500 leukocyte esterase, >50 WBCs, 1+ bacteria; urine cultures in progress.  EKG:  Sinus rhythm, no overt ST-T changes indicative of acute ischemia  Imaging: CT head and C-spine negative for acute process; postsurgical changes noted to the C-spine.  XR of the left hip negative for acute process but does note advanced osteoarthritis.  CXR negative for acute cardiopulmonary process.  Interventions: Ceftriaxone 2 g x 1, 1 L normal saline bolus, trauma consultation, admitted to medicine    12-point ROS reviewed and found to be negative aside from aforementioned positives in HPI and/or noted in dedicated ROS section below.     ED Course (From ED Provider):  Diagnoses as of 12/21/24 2202   Cystitis   Acute kidney injury (CMS-HCC)   Elevated troponin   Hypomagnesemia     Relevant Results  Results for orders placed or performed during the hospital encounter of 12/21/24 (from the past 24 hours)   POCT GLUCOSE   Result Value Ref Range    POCT Glucose 158 (H) 74 - 99 mg/dL   CBC and Auto Differential   Result Value Ref Range    WBC 15.2 (H) 4.4 - 11.3 x10*3/uL    nRBC 0.0 0.0 - 0.0 /100 WBCs    RBC 4.37 4.00 - 5.20 x10*6/uL    Hemoglobin 11.9 (L) 12.0 - 16.0 g/dL    Hematocrit 36.3 36.0 - 46.0 %    MCV 83 80 - 100 fL    MCH 27.2 26.0 - 34.0 pg    MCHC 32.8 32.0 - 36.0 g/dL    RDW 13.7 11.5 - 14.5 %    Platelets 260 150 - 450 x10*3/uL    Neutrophils % 84.2 40.0 - 80.0 %    Immature Granulocytes %, Automated 0.4 0.0 - 0.9 %    Lymphocytes % 7.6 13.0 - 44.0 %    Monocytes % 7.6 2.0 - 10.0 %    Eosinophils % 0.1 0.0 - 6.0 %    Basophils % 0.1 0.0 - 2.0 %    Neutrophils Absolute 12.77 (H) 1.60 - 5.50 x10*3/uL    Immature Granulocytes Absolute, Automated 0.06 0.00 - 0.50 x10*3/uL    Lymphocytes Absolute 1.16 0.80 - 3.00 x10*3/uL    Monocytes Absolute 1.15 (H) 0.05 - 0.80 x10*3/uL    Eosinophils Absolute 0.01 0.00 - 0.40 x10*3/uL    Basophils Absolute 0.02 0.00 - 0.10 x10*3/uL   Magnesium   Result Value Ref Range    Magnesium 1.49 (L) 1.60 - 2.40 mg/dL   Comprehensive metabolic panel   Result Value Ref Range     Glucose 173 (H) 74 - 99 mg/dL    Sodium 139 136 - 145 mmol/L    Potassium 4.1 3.5 - 5.3 mmol/L    Chloride 107 98 - 107 mmol/L    Bicarbonate 22 21 - 32 mmol/L    Anion Gap 14 10 - 20 mmol/L    Urea Nitrogen 28 (H) 6 - 23 mg/dL    Creatinine 1.60 (H) 0.50 - 1.05 mg/dL    eGFR 34 (L) >60 mL/min/1.73m*2    Calcium 8.5 (L) 8.6 - 10.3 mg/dL    Albumin 3.6 3.4 - 5.0 g/dL    Alkaline Phosphatase 66 33 - 136 U/L    Total Protein 6.3 (L) 6.4 - 8.2 g/dL    AST 35 9 - 39 U/L    Bilirubin, Total 2.3 (H) 0.0 - 1.2 mg/dL    ALT 26 7 - 45 U/L   Lipase   Result Value Ref Range    Lipase 3 (L) 9 - 82 U/L   Influenza A, and B PCR   Result Value Ref Range    Flu A Result Not Detected Not Detected    Flu B Result Not Detected Not Detected   Sars-CoV-2 PCR   Result Value Ref Range    Coronavirus 2019, PCR Not Detected Not Detected   Troponin I, High Sensitivity   Result Value Ref Range    Troponin I, High Sensitivity 55 (HH) 0 - 13 ng/L   Troponin I, High Sensitivity   Result Value Ref Range    Troponin I, High Sensitivity 42 (H) 0 - 13 ng/L   Urinalysis with Reflex Culture and Microscopic   Result Value Ref Range    Color, Urine Dark-Yellow Light-Yellow, Yellow, Dark-Yellow    Appearance, Urine Ex.Turbid (N) Clear    Specific Gravity, Urine 1.025 1.005 - 1.035    pH, Urine 5.5 5.0, 5.5, 6.0, 6.5, 7.0, 7.5, 8.0    Protein, Urine 100 (2+) (A) NEGATIVE, 10 (TRACE), 20 (TRACE) mg/dL    Glucose, Urine Normal Normal mg/dL    Blood, Urine 1.0 (3+) (A) NEGATIVE    Ketones, Urine NEGATIVE NEGATIVE mg/dL    Bilirubin, Urine NEGATIVE NEGATIVE    Urobilinogen, Urine Normal Normal mg/dL    Nitrite, Urine 1+ (A) NEGATIVE    Leukocyte Esterase, Urine 500 Lucie/µL (A) NEGATIVE   Microscopic Only, Urine   Result Value Ref Range    WBC, Urine >50 (A) 1-5, NONE /HPF    RBC, Urine >20 (A) NONE, 1-2, 3-5 /HPF    Squamous Epithelial Cells, Urine 10-25 (FEW) Reference range not established. /HPF    Bacteria, Urine 1+ (A) NONE SEEN /HPF    Mucus, Urine FEW  Reference range not established. /LPF      XR chest 1 view    Result Date: 12/21/2024  Interpreted By:  Mauri Kendall, STUDY: XR CHEST 1 VIEW   INDICATION: Signs/Symptoms:fall.   COMPARISON: August 16, 2016   ACCESSION NUMBER(S): [id number]   ORDERING CLINICIAN: KIRILL WILKINSON   FINDINGS: No consolidation, effusion, edema, pneumothorax. Mild cardiomegaly unchanged.       No evidence of acute intrathoracic abnormality.   Signed by: Mauri Kendall 12/21/2024 5:32 PM Dictation workstation:   [id number]    XR hip left with pelvis when performed 2 or 3 views    Result Date: 12/21/2024  Interpreted By:  Mauri Kendall, STUDY: XR HIP LEFT WITH PELVIS WHEN PERFORMED 2 OR 3 VIEWS   INDICATION: Signs/Symptoms:fall.   COMPARISON: October 30, 2017   ACCESSION NUMBER(S): [id number]   ORDERING CLINICIAN: KIRILL WILKINSON   FINDINGS: Advanced osteoarthritis left hip.   No evidence of fracture or dislocation.       Advanced left hip osteoarthritis.   Signed by: Mauri Kendall 12/21/2024 5:32 PM Dictation workstation:   [id number]    CT head wo IV contrast    Result Date: 12/21/2024  Interpreted By:  Tray William, STUDY: CT HEAD WO IV CONTRAST; CT CERVICAL SPINE WO IV CONTRAST;  12/21/2024 4:52 pm   INDICATION: Signs/Symptoms:fall.     COMPARISON: CT head dated 03/25/2009.   ACCESSION NUMBER(S): [id number]; [id number]   ORDERING CLINICIAN: NEREIDA GUTIERREZ   TECHNIQUE: Noncontrast axial CT scan of head was performed, with coronal and sagittal reformats provided. The images were reviewed in bone, brain, blood and soft tissue windows.   Axial CT images of the cervical spine are obtained. Axial, coronal and sagittal reconstructions are provided for review.   FINDINGS: CT HEAD:   No hyperdense intracranial hemorrhage is present. There is no mass effect or midline shift identified.   Gray-white differentiation is intact, without evidence of CT apparent transcortical infarct. Subtle attenuation changes in the periventricular white  matter is nonspecific, but favored to represent sequela of microvascular disease.   No abnormal ventricular dilatation is present. Basal cisterns are patent. No extra-axial fluid collections are identified.   Scalp soft tissues do not demonstrate any acute abnormality. No skull fracture, or other acute calvarial abnormality is present.   Mastoid air cells and middle ear cavities are clear. Some frothy layering secretions are present in the left sphenoid and right maxillary sinus. Bony orbits appear preserved.   CT C-SPINE:   Surgical changes of ACDF of C5-C6 are present, with the associated beam hardening artifact somewhat limiting evaluation of the adjacent structures.   There is reversal normal lordotic curvature of the cervical spine, without evidence of significant spondylolisthesis.   Cervical vertebral body heights are preserved, without evidence of compression fractures. Posterior elements of the cervical spine do not demonstrate any evidence of acute trauma.   Craniocervical junction is intact, with a advanced degenerative changes present at the atlantoaxial articulation, with the associated heterotopic calcifications/osteophytosis. Posterior elements of the cervical spine are preserved without evidence of traumatic subluxation or widening, although multilevel degenerate facet osteoarthropathy is present, with bony ankylosis of the articular processes of C2-C3 and C3-C4.   Multilevel intervertebral disc height loss is present, moderate to severe at the levels of C4-C5, C5-C6, and C6-C7. Moderate stenosis is present at C7 T1.   No high-grade stenosis is identified, although mild-to-moderate narrowing is suspected at the levels of C4-C5 due to disc osteophyte complex and ligamentum flavum thickening.   Multilevel neural foraminal stenosis is present, with likely moderate if not severe narrowing suspected at C4-C5 and C5-C6 bilaterally due to hypertrophic uncovertebral and facet joint changes.   Prevertebral  and paraspinal soft tissues do not demonstrate any acute abnormality.       CT HEAD: 1. No evidence of hemorrhage, skull fracture, or other acute intracranial abnormality. 2. Subtle attenuation changes in the periventricular white matter are nonspecific, but favored to represent sequela of microvascular disease.   CT C-SPINE: 1. Postsurgical changes of ACDF of C5-C6, without evidence of acute trauma to the cervical spine. 2. Spondylotic changes of the cervical spine as detailed above.   MACRO: None   Signed by: Tray William 12/21/2024 5:30 PM Dictation workstation:   [id number]    CT cervical spine wo IV contrast    Result Date: 12/21/2024  Interpreted By:  Tray William, STUDY: CT HEAD WO IV CONTRAST; CT CERVICAL SPINE WO IV CONTRAST;  12/21/2024 4:52 pm   INDICATION: Signs/Symptoms:fall.     COMPARISON: CT head dated 03/25/2009.   ACCESSION NUMBER(S): [id number]; [id number]   ORDERING CLINICIAN: NEREIDA GUTIERREZ   TECHNIQUE: Noncontrast axial CT scan of head was performed, with coronal and sagittal reformats provided. The images were reviewed in bone, brain, blood and soft tissue windows.   Axial CT images of the cervical spine are obtained. Axial, coronal and sagittal reconstructions are provided for review.   FINDINGS: CT HEAD:   No hyperdense intracranial hemorrhage is present. There is no mass effect or midline shift identified.   Gray-white differentiation is intact, without evidence of CT apparent transcortical infarct. Subtle attenuation changes in the periventricular white matter is nonspecific, but favored to represent sequela of microvascular disease.   No abnormal ventricular dilatation is present. Basal cisterns are patent. No extra-axial fluid collections are identified.   Scalp soft tissues do not demonstrate any acute abnormality. No skull fracture, or other acute calvarial abnormality is present.   Mastoid air cells and middle ear cavities are clear. Some frothy layering  secretions are present in the left sphenoid and right maxillary sinus. Bony orbits appear preserved.   CT C-SPINE:   Surgical changes of ACDF of C5-C6 are present, with the associated beam hardening artifact somewhat limiting evaluation of the adjacent structures.   There is reversal normal lordotic curvature of the cervical spine, without evidence of significant spondylolisthesis.   Cervical vertebral body heights are preserved, without evidence of compression fractures. Posterior elements of the cervical spine do not demonstrate any evidence of acute trauma.   Craniocervical junction is intact, with a advanced degenerative changes present at the atlantoaxial articulation, with the associated heterotopic calcifications/osteophytosis. Posterior elements of the cervical spine are preserved without evidence of traumatic subluxation or widening, although multilevel degenerate facet osteoarthropathy is present, with bony ankylosis of the articular processes of C2-C3 and C3-C4.   Multilevel intervertebral disc height loss is present, moderate to severe at the levels of C4-C5, C5-C6, and C6-C7. Moderate stenosis is present at C7 T1.   No high-grade stenosis is identified, although mild-to-moderate narrowing is suspected at the levels of C4-C5 due to disc osteophyte complex and ligamentum flavum thickening.   Multilevel neural foraminal stenosis is present, with likely moderate if not severe narrowing suspected at C4-C5 and C5-C6 bilaterally due to hypertrophic uncovertebral and facet joint changes.   Prevertebral and paraspinal soft tissues do not demonstrate any acute abnormality.       CT HEAD: 1. No evidence of hemorrhage, skull fracture, or other acute intracranial abnormality. 2. Subtle attenuation changes in the periventricular white matter are nonspecific, but favored to represent sequela of microvascular disease.   CT C-SPINE: 1. Postsurgical changes of ACDF of C5-C6, without evidence of acute trauma to the  cervical spine. 2. Spondylotic changes of the cervical spine as detailed above.   MACRO: None   Signed by: Tray William 12/21/2024 5:30 PM Dictation workstation:   [id number]    Scheduled medications:  apixaban, 2.5 mg, oral, BID  cefTRIAXone, 2 g, intravenous, Once  insulin lispro, 0-10 Units, subcutaneous, Before meals & nightly  metoprolol tartrate, 25 mg, oral, BID  [START ON 12/22/2024] montelukast, 10 mg, oral, Daily  [START ON 12/22/2024] pantoprazole, 40 mg, oral, Daily before breakfast   Or  [START ON 12/22/2024] pantoprazole, 40 mg, intravenous, Daily before breakfast  [START ON 12/22/2024] polyethylene glycol, 17 g, oral, Daily      Continuous medications:     PRN medications:  PRN medications: acetaminophen, albuterol, bisacodyl, bisacodyl, dextrose, dextrose, glucagon, glucagon, guaiFENesin, melatonin, ondansetron **OR** ondansetron     Past Medical History  She has a past medical history of Age-related nuclear cataract of right eye (08/05/2019), Other specified anxiety disorders (07/13/2018), Personal history of other diseases of the circulatory system (11/10/2017), Personal history of other diseases of the respiratory system (11/10/2017), Personal history of other specified conditions, Unspecified asthma, uncomplicated ([facility]) (12/11/2019), and Urinary incontinence (05/12/2023).    Surgical History  She has no past surgical history on file.     Social History  She reports that she has never smoked. She has never used smokeless tobacco. She reports that she does not drink alcohol and does not use drugs.  She lives with her daughter     Family History  No family history on file.    Allergies  Codeine, Dextroamphetamine, Dextromethorphan, Fluticasone propion-salmeterol, Lidocaine, Morphine, Other, Penicillins, and Sulfa (sulfonamide antibiotics)    Code Status  Full Code     Review of Systems   Constitutional:  Positive for activity change, appetite change and fatigue. Negative for chills and  fever.   Eyes:  Negative for photophobia and visual disturbance.   Respiratory:  Positive for cough. Negative for shortness of breath.    Cardiovascular:  Negative for chest pain, palpitations and leg swelling.   Gastrointestinal:  Positive for abdominal pain, nausea and vomiting. Negative for blood in stool.   Endocrine: Negative for polydipsia and polyuria.   Genitourinary:  Negative for decreased urine volume, dysuria, hematuria and urgency.   Musculoskeletal:  Positive for back pain and gait problem.   Skin:  Negative for color change and rash.   Neurological:  Positive for weakness. Negative for dizziness, tremors, syncope and light-headedness.   Psychiatric/Behavioral:  Negative for confusion and sleep disturbance.    All other systems reviewed and are negative.    Last Recorded Vitals  BP 98/51   Pulse 93   Temp 36.9 °C (98.4 °F)   Resp 20   Wt 125 kg (275 lb 2.2 oz)   SpO2 95%      Physical Exam:  Vital signs and nursing notes reviewed.   Constitutional: Pleasant and cooperative. Laying in bed in no acute distress. Conversant.   Skin: Warm and dry; no obvious lesions, rashes, pallor, or jaundice.   Eyes: EOMI. Anicteric sclera.   ENT: Mucous membranes slightly dry; no obvious injury or deformity appreciated.   Head and Neck: Normocephalic, atraumatic. ROM preserved. Trachea midline. No appreciable JVD.   Respiratory: Nonlabored on RA. Lungs clear to auscultation bilaterally without obvious adventitious sounds. Chest rise is equal.  Cardiovascular: RRR. No gross murmur, gallop, or rub. Extremities are warm and well-perfused with good capillary refill (< 3 seconds). No chest wall tenderness.   GI: Abdomen soft, nontender, nondistended. No obvious organomegaly appreciated. Prior surgical scar well-appearing.   : No CVA tenderness.   MSK: No gross abnormalities appreciated. No limitations to AROM/PROM appreciated. Global weakness.   Extremities: No cyanosis, edema, or clubbing evident. Neurovascularly  intact.   Neuro: A&Ox3. CN 2-12 grossly intact. Able to respond to questions appropriately and clearly. No acute focal neurologic deficits appreciated.  Psych: Appropriate mood and behavior.    Assessment/Plan     74 y.o. female with PMHx s/f HTN, HLD, IST, asthma, allergic rhinitis, prior DVT on Eliquis (2.5mg BID), T2DM, OA, cLBP, h/o cervical cord compression s/p C5-6 ACDF (04/19/24 - [facility]), chronic gait instability (with use of walker at home), presenting after a fall at home.     Sepsis without shock 2/2 UTI  -SIRS criteria (2/4): WBCs, HR   -Source: as above  -End-organ dysfunction: SHANTANU  -Lactate ordered, pending  -BP is low-normal, s/p 1L NS. Will continue on IVF; appears a little dry, have room to give another bolus if need be  -Blood cultures x2 in process. Urine culture in process.   -Follow fever curve, WBCs  -Continue antibiotic coverage with ceftriaxone; adjust pending culture results     Acute Kidney Injury  -Baseline serum creatinine: ~0.5-0.6  -Creatinine at admission: 1.60  -Suspect secondary to decreased oral intake (nausea), and episodes of emesis, home antihypertensives, mild rhabdomyolysis   -IVF: s/p 1L NS, continued on IVF as noted above   -Hold home ARB for now   -Recheck renal function panel in AM   -Nephrology consultation if not improving, appreciate further recommendations     Rhabdomyolysis 2/2 prolonged down-time after fall at home   -I ordered an add-on CPK after evaluating the patient and it resulted at 533   -Continue on IVF overnight  -Recheck in AM    Elevated troponin, suspect demand ischemia   -Pt with HSTI 55 --> 42   -No CP or anginal equivalents   -No acute ischemic changes on EKG   -Just had negative cardiac stress testing with Dr. Mazariegos on 12/18/24   -Continue home therapies. Monitor on tele.   -Suspect demand-mediated from sepsis, SHANTANU. Unless change in symptoms or clinical condition will hold off on additional eval given lack of symptoms and recent neg testing.      Accidental fall in home with head injury, no LOC, on Eliquis   -No prodromal symptoms   -No traumatic injuries on imaging   -Trauma consultation appreciated   -Fall precautions, PT/OT    Hypomagnesemia   -Mag 1.49, I ordered 2g replacement   -Likely 2/2 decreased intake and GI losses   -Recheck and replete PRN    HTN, HLD   -Hold home Losartan with SHANTANU   -OK to cautiously resume home metop with hold parameters   -Continue to monitor closely     IST   -OK to cautiously resume home metop with hold parameters   -CV: Dr. Mazariegos     Asthma without acute exacerbation   (+)hx recent RSV  -Continue home inhaler   -Pulm hygiene / supportive care     DM-II   -Hold home oral meds for now   -Continue with SSI ACHS   -Accucheks, hypoglycemic protocol   -Monitor and adjust as needed      H/o DVT   -Continue Eliquis     OA, cLBP  h/o cervical cord compression s/p C5-6 ACDF (04/19/24 - [facility])  -Continue any/all home therapies as appropriate   -PT/OT  -Pain control   -Continue outpatient follow-up with [facility] as scheduled     Acute on chronic gait instability, physical deconditioning   -PT/OT  -Fall precautions   -SW/CM consultations appreciated     Morbid Obesity (BMI 44.41)   -Patient does not meet morbid/severe criteria for obesity on admission   -Continued outpatient follow-up with PCP, healthy dietary and lifestyle changes as able    -Is working on weight loss; notably has been on ozempic outpatient; ?contribution to poor appetite and intake     Diet: Carb controlled   DVT Prophylaxis: SCDs, Home Eliquis  Code Status: Full Code  Case Discussed With: ED provider  Additional Sources Reviewed: ED note day of admission, surgery consult day of admission, outpatient notes from cardiology, spine surgery, neurology    Anticipated Length of Stay (LOS): Patient will likely require two midnight stay to follow urine culture, trend renal function and CPK, and obtain PT/OT consultations with possible placement.      Savana Smallwood,  HERO    Dragon dictation software was used to dictate this note and thus there may be minor errors in translation/transcription including garbled speech or misspellings. Please contact for clarification if needed.

## 2024-12-23 LAB
ANION GAP SERPL CALC-SCNC: 8 MMOL/L (ref 10–20)
APPEARANCE UR: CLEAR
ATRIAL RATE: 87 BPM
BACTERIA UR CULT: NORMAL
BILIRUB UR STRIP.AUTO-MCNC: NEGATIVE MG/DL
BUN SERPL-MCNC: 12 MG/DL (ref 6–23)
CALCIUM SERPL-MCNC: 7.5 MG/DL (ref 8.6–10.3)
CHLORIDE SERPL-SCNC: 104 MMOL/L (ref 98–107)
CK SERPL-CCNC: 632 U/L (ref 0–215)
CO2 SERPL-SCNC: 27 MMOL/L (ref 21–32)
COLOR UR: ABNORMAL
CREAT SERPL-MCNC: 0.72 MG/DL (ref 0.5–1.05)
EGFRCR SERPLBLD CKD-EPI 2021: 88 ML/MIN/1.73M*2
GLUCOSE BLD MANUAL STRIP-MCNC: 129 MG/DL (ref 74–99)
GLUCOSE BLD MANUAL STRIP-MCNC: 162 MG/DL (ref 74–99)
GLUCOSE BLD MANUAL STRIP-MCNC: 171 MG/DL (ref 74–99)
GLUCOSE BLD MANUAL STRIP-MCNC: 177 MG/DL (ref 74–99)
GLUCOSE SERPL-MCNC: 215 MG/DL (ref 74–99)
GLUCOSE UR STRIP.AUTO-MCNC: NORMAL MG/DL
KETONES UR STRIP.AUTO-MCNC: NEGATIVE MG/DL
LEUKOCYTE ESTERASE UR QL STRIP.AUTO: ABNORMAL
NITRITE UR QL STRIP.AUTO: NEGATIVE
P AXIS: 60 DEGREES
PH UR STRIP.AUTO: 6 [PH]
POTASSIUM SERPL-SCNC: 3.3 MMOL/L (ref 3.5–5.3)
PR INTERVAL: 143 MS
PROT UR STRIP.AUTO-MCNC: NEGATIVE MG/DL
Q ONSET: 251 MS
QRS COUNT: 15 BEATS
QRS DURATION: 104 MS
QT INTERVAL: 357 MS
QTC CALCULATION(BAZETT): 430 MS
QTC FREDERICIA: 404 MS
R AXIS: 53 DEGREES
RBC # UR STRIP.AUTO: NEGATIVE /UL
RBC #/AREA URNS AUTO: ABNORMAL /HPF
SODIUM SERPL-SCNC: 136 MMOL/L (ref 136–145)
SP GR UR STRIP.AUTO: 1.01
T AXIS: 29 DEGREES
T OFFSET: 429 MS
UROBILINOGEN UR STRIP.AUTO-MCNC: NORMAL MG/DL
VENTRICULAR RATE: 87 BPM
WBC #/AREA URNS AUTO: ABNORMAL /HPF

## 2024-12-23 PROCEDURE — 87040 BLOOD CULTURE FOR BACTERIA: CPT | Mod: PORLAB | Performed by: INTERNAL MEDICINE

## 2024-12-23 PROCEDURE — 97530 THERAPEUTIC ACTIVITIES: CPT | Mod: GO,CO

## 2024-12-23 PROCEDURE — 36415 COLL VENOUS BLD VENIPUNCTURE: CPT | Performed by: INTERNAL MEDICINE

## 2024-12-23 PROCEDURE — 2500000002 HC RX 250 W HCPCS SELF ADMINISTERED DRUGS (ALT 637 FOR MEDICARE OP, ALT 636 FOR OP/ED): Performed by: STUDENT IN AN ORGANIZED HEALTH CARE EDUCATION/TRAINING PROGRAM

## 2024-12-23 PROCEDURE — 99233 SBSQ HOSP IP/OBS HIGH 50: CPT | Performed by: INTERNAL MEDICINE

## 2024-12-23 PROCEDURE — 2500000004 HC RX 250 GENERAL PHARMACY W/ HCPCS (ALT 636 FOR OP/ED): Performed by: INTERNAL MEDICINE

## 2024-12-23 PROCEDURE — 81001 URINALYSIS AUTO W/SCOPE: CPT | Performed by: INTERNAL MEDICINE

## 2024-12-23 PROCEDURE — 2500000004 HC RX 250 GENERAL PHARMACY W/ HCPCS (ALT 636 FOR OP/ED): Performed by: STUDENT IN AN ORGANIZED HEALTH CARE EDUCATION/TRAINING PROGRAM

## 2024-12-23 PROCEDURE — 76937 US GUIDE VASCULAR ACCESS: CPT

## 2024-12-23 PROCEDURE — 1200000002 HC GENERAL ROOM WITH TELEMETRY DAILY

## 2024-12-23 PROCEDURE — 97535 SELF CARE MNGMENT TRAINING: CPT | Mod: GO,CO

## 2024-12-23 PROCEDURE — 2500000001 HC RX 250 WO HCPCS SELF ADMINISTERED DRUGS (ALT 637 FOR MEDICARE OP): Performed by: STUDENT IN AN ORGANIZED HEALTH CARE EDUCATION/TRAINING PROGRAM

## 2024-12-23 PROCEDURE — 2500000001 HC RX 250 WO HCPCS SELF ADMINISTERED DRUGS (ALT 637 FOR MEDICARE OP): Performed by: INTERNAL MEDICINE

## 2024-12-23 PROCEDURE — 87086 URINE CULTURE/COLONY COUNT: CPT | Mod: PORLAB | Performed by: INTERNAL MEDICINE

## 2024-12-23 PROCEDURE — 82550 ASSAY OF CK (CPK): CPT | Performed by: INTERNAL MEDICINE

## 2024-12-23 PROCEDURE — 82947 ASSAY GLUCOSE BLOOD QUANT: CPT

## 2024-12-23 PROCEDURE — 80048 BASIC METABOLIC PNL TOTAL CA: CPT | Performed by: INTERNAL MEDICINE

## 2024-12-23 PROCEDURE — 97116 GAIT TRAINING THERAPY: CPT | Mod: CQ,GP

## 2024-12-23 PROCEDURE — 2500000002 HC RX 250 W HCPCS SELF ADMINISTERED DRUGS (ALT 637 FOR MEDICARE OP, ALT 636 FOR OP/ED): Performed by: INTERNAL MEDICINE

## 2024-12-23 RX ORDER — POTASSIUM CHLORIDE 20 MEQ/1
40 TABLET, EXTENDED RELEASE ORAL ONCE
Status: COMPLETED | OUTPATIENT
Start: 2024-12-23 | End: 2024-12-23

## 2024-12-23 RX ORDER — SODIUM CHLORIDE, SODIUM LACTATE, POTASSIUM CHLORIDE, CALCIUM CHLORIDE 600; 310; 30; 20 MG/100ML; MG/100ML; MG/100ML; MG/100ML
100 INJECTION, SOLUTION INTRAVENOUS CONTINUOUS
Status: ACTIVE | OUTPATIENT
Start: 2024-12-23 | End: 2024-12-24

## 2024-12-23 RX ORDER — SODIUM CHLORIDE, SODIUM LACTATE, POTASSIUM CHLORIDE, CALCIUM CHLORIDE 600; 310; 30; 20 MG/100ML; MG/100ML; MG/100ML; MG/100ML
75 INJECTION, SOLUTION INTRAVENOUS CONTINUOUS
Status: DISCONTINUED | OUTPATIENT
Start: 2024-12-23 | End: 2024-12-23

## 2024-12-23 RX ORDER — CEFTRIAXONE 1 G/50ML
1 INJECTION, SOLUTION INTRAVENOUS EVERY 24 HOURS
Status: DISCONTINUED | OUTPATIENT
Start: 2024-12-23 | End: 2024-12-23

## 2024-12-23 RX ADMIN — SODIUM CHLORIDE, POTASSIUM CHLORIDE, SODIUM LACTATE AND CALCIUM CHLORIDE 100 ML/HR: 600; 310; 30; 20 INJECTION, SOLUTION INTRAVENOUS at 09:51

## 2024-12-23 RX ADMIN — APIXABAN 5 MG: 5 TABLET, FILM COATED ORAL at 09:46

## 2024-12-23 RX ADMIN — SODIUM CHLORIDE, POTASSIUM CHLORIDE, SODIUM LACTATE AND CALCIUM CHLORIDE 100 ML/HR: 600; 310; 30; 20 INJECTION, SOLUTION INTRAVENOUS at 15:45

## 2024-12-23 RX ADMIN — METOPROLOL TARTRATE 25 MG: 25 TABLET, FILM COATED ORAL at 21:06

## 2024-12-23 RX ADMIN — CEFTRIAXONE SODIUM 2 G: 2 INJECTION, SOLUTION INTRAVENOUS at 22:50

## 2024-12-23 RX ADMIN — INSULIN LISPRO 2 UNITS: 100 INJECTION, SOLUTION INTRAVENOUS; SUBCUTANEOUS at 12:19

## 2024-12-23 RX ADMIN — INSULIN LISPRO 2 UNITS: 100 INJECTION, SOLUTION INTRAVENOUS; SUBCUTANEOUS at 16:50

## 2024-12-23 RX ADMIN — APIXABAN 5 MG: 5 TABLET, FILM COATED ORAL at 21:06

## 2024-12-23 RX ADMIN — SODIUM CHLORIDE, POTASSIUM CHLORIDE, SODIUM LACTATE AND CALCIUM CHLORIDE 100 ML/HR: 600; 310; 30; 20 INJECTION, SOLUTION INTRAVENOUS at 06:09

## 2024-12-23 RX ADMIN — MONTELUKAST 10 MG: 10 TABLET, FILM COATED ORAL at 09:47

## 2024-12-23 RX ADMIN — PANTOPRAZOLE SODIUM 40 MG: 40 TABLET, DELAYED RELEASE ORAL at 09:46

## 2024-12-23 RX ADMIN — POTASSIUM CHLORIDE 40 MEQ: 1500 TABLET, EXTENDED RELEASE ORAL at 09:46

## 2024-12-23 RX ADMIN — METOPROLOL TARTRATE 25 MG: 25 TABLET, FILM COATED ORAL at 09:46

## 2024-12-23 RX ADMIN — INSULIN LISPRO 2 UNITS: 100 INJECTION, SOLUTION INTRAVENOUS; SUBCUTANEOUS at 09:48

## 2024-12-23 ASSESSMENT — PAIN - FUNCTIONAL ASSESSMENT
PAIN_FUNCTIONAL_ASSESSMENT: 0-10

## 2024-12-23 ASSESSMENT — PAIN SCALES - GENERAL
PAINLEVEL_OUTOF10: 0 - NO PAIN

## 2024-12-23 ASSESSMENT — COGNITIVE AND FUNCTIONAL STATUS - GENERAL
TOILETING: A LITTLE
MOVING TO AND FROM BED TO CHAIR: A LITTLE
DRESSING REGULAR LOWER BODY CLOTHING: A LITTLE
DAILY ACTIVITIY SCORE: 16
DRESSING REGULAR LOWER BODY CLOTHING: A LITTLE
HELP NEEDED FOR BATHING: A LOT
WALKING IN HOSPITAL ROOM: A LITTLE
DRESSING REGULAR UPPER BODY CLOTHING: A LITTLE
STANDING UP FROM CHAIR USING ARMS: A LOT
PERSONAL GROOMING: A LOT
TOILETING: A LOT
MOBILITY SCORE: 15
MOVING TO AND FROM BED TO CHAIR: A LITTLE
DRESSING REGULAR UPPER BODY CLOTHING: A LITTLE
WALKING IN HOSPITAL ROOM: A LOT
MOBILITY SCORE: 15
EATING MEALS: A LITTLE
DAILY ACTIVITIY SCORE: 18
TURNING FROM BACK TO SIDE WHILE IN FLAT BAD: A LOT
MOVING FROM LYING ON BACK TO SITTING ON SIDE OF FLAT BED WITH BEDRAILS: A LOT
HELP NEEDED FOR BATHING: A LITTLE
STANDING UP FROM CHAIR USING ARMS: A LITTLE
CLIMB 3 TO 5 STEPS WITH RAILING: A LOT
MOVING FROM LYING ON BACK TO SITTING ON SIDE OF FLAT BED WITH BEDRAILS: A LITTLE
PERSONAL GROOMING: A LITTLE
CLIMB 3 TO 5 STEPS WITH RAILING: A LOT
TURNING FROM BACK TO SIDE WHILE IN FLAT BAD: A LITTLE

## 2024-12-23 ASSESSMENT — ACTIVITIES OF DAILY LIVING (ADL)
LACK_OF_TRANSPORTATION: NO
HOME_MANAGEMENT_TIME_ENTRY: 15

## 2024-12-23 NOTE — CONSULTS
Vascular Access Team  Consult     Visit Date: 12/23/2024      Patient Name: Mary Dao         MRN: [id number]                Reason for Consult: Called to bedside for IV access and blood cultures      Assessment: Two US guided IVs placed. Blood cultures drawn.           Marilin Jim RN  12/23/2024  10:23 AM

## 2024-12-23 NOTE — PROGRESS NOTES
Occupational Therapy    OT Treatment    Patient Name: Mary Dao  MRN: [id number]  Department: [address]  Room: 2022/2022-A  Today's Date: 12/23/2024  Time Calculation  Start Time: 1123  Stop Time: 1203  Time Calculation (min): 40 min        Assessment:  OT Assessment: Pt significantly progressed this session, demo's improved activity tolerance and decreased assist required. Pt demo's good safety throughout tx session.  End of Session Communication: Bedside nurse  End of Session Patient Position: Bed, 3 rail up, Alarm on     Plan:  Treatment Interventions: ADL retraining, Functional transfer training, UE strengthening/ROM, Endurance training, Cognitive reorientation, Patient/family training, Equipment evaluation/education, Neuromuscular reeducation  OT Frequency: 3 times per week  OT Discharge Recommendations: Moderate intensity level of continued care  OT - OK to Discharge: Yes  Treatment Interventions: ADL retraining, Functional transfer training, UE strengthening/ROM, Endurance training, Cognitive reorientation, Patient/family training, Equipment evaluation/education, Neuromuscular reeducation    Subjective   Previous Visit Info:  OT Last Visit  OT Received On: 12/23/24  General:  General  Prior to Session Communication: Bedside nurse  Patient Position Received: Bed, 3 rail up, Alarm on  General Comment: Pt agreeable and cooperative to therapy.          Pain:  Pain Assessment  Pain Assessment: 0-10  0-10 (Numeric) Pain Score: 0 - No pain    Objective    Cognition:  Cognition  Orientation Level: Disoriented X4       Activities of Daily Living:      Grooming  Grooming Level of Assistance: Moderate assistance (d/t decreased shoulder ROM, per pt her daughter assists at home PRN)                   LE Dressing  LE Dressing: Yes  Shoe Level of Assistance: Setup  LE Dressing Where Assessed: Edge of bed  LE Dressing Comments: to don slide on shoes       Functional Standing Tolerance:  Activity: Pt completed x4 stands  from EOB initially requiring CGA.  Bed Mobility/Transfers: Bed Mobility 1  Bed Mobility 1: Supine to sitting  Level of Assistance 1:  (=)    Transfers  Transfer: Yes  Transfer 1  Technique 1: Sit to stand, Stand to sit  Transfer Device 1:  (rollator)  Transfer Level of Assistance 1: Minimum assistance  Transfers 2  Transfer From 2: Bed to  Transfer to 2: Chair with arms  Technique 2: Sit to stand, Stand to sit  Transfer Device 2:  (rollator)  Transfer Level of Assistance 2: Contact guard           Functional Mobility:  Functional Mobility  Functional Mobility Performed: Yes  Functional Mobility 1  Comments 1: Pt completed x3 trials of functional mobility min  and max household distance with CGA.    Therapy/Activity:      Therapeutic Activity  Therapeutic Activity Performed: Yes  Therapeutic Activity 1: Pt tolerated sitting EOB for 12 minutes  with SBA.      Outcome Measures:[facility] Daily Activity  Putting on and taking off regular lower body clothing: A little  Bathing (including washing, rinsing, drying): A lot  Putting on and taking off regular upper body clothing: A little  Toileting, which includes using toilet, bedpan or urinal: A lot  Taking care of personal grooming such as brushing teeth: A lot  Eating Meals: None  Daily Activity - Total Score: 16        Education Documentation  ADL Training, taught by MARIANNA Ramirez at 12/23/2024  1:20 PM.  Learner: Patient  Readiness: Acceptance  Method: Explanation  Response: Needs Reinforcement    Education Comments  No comments found.             Goals:  Encounter Problems       Encounter Problems (Active)       ADLs       Patient will complete daily grooming tasks brushing teeth and washing face/hair with set-up, supervision level of assistance and PRN adaptive equipment while supported sitting. (Progressing)       Start:  12/22/24    Expected End:  01/05/25               BALANCE       Pt will maintain dynamic sitting balance during ADL task x15 minutes with  supervision level of assistance in order to demonstrate decreased risk of falling and improved postural control. (Progressing)       Start:  12/22/24    Expected End:  01/05/25               TRANSFERS       Patient will complete functional transfers with least restrictive device with minimal assist  level of assistance. (Progressing)       Start:  12/22/24    Expected End:  01/05/25

## 2024-12-23 NOTE — PROGRESS NOTES
Mary Dao is a 74 y.o. female on day 2 of admission presenting with Cystitis.    Subjective   Mary Dao is a 74 y.o. female with PMHx s/f HTN, HLD, IST, asthma, allergic rhinitis, prior DVT on Eliquis (2.5mg BID), T2DM, OA, cLBP, h/o cervical cord compression s/p C5-6 ACDF (04/19/24 - [facility]), chronic gait instability (with use of walker at home), presenting after a fall at home. Pt reports that she was at home and trying to reach for something, falling out of her recliner/chair ~0200 morning of admission (12/21/24). She did hit her head, she did not lose consciousness. This was unwitnessed. She was on the ground for quite some time; was unable to get up by herself d/t chronic hip pain. Notably, she had RSV followed by persistent symptoms (was treated for either the flu or possible PNA --? was started on doxycycline), but has been feeling overall unwell. Has had nausea, a few episodes of NBNB emesis (since she initially became sick), has had poor appetite and intake, and just feels overall poor. She has had some generalized abdominal discomfort. Denies cp/pressure, palpitations, diaphoresis, SOB, SEAMAN, dizziness / lightheadedness, syncope or near syncope, HA, vision changes, fever, chills, focal and/or lateralizing sensory or motor deficits.       ED Course (Summary - please note all labs, imaging studies, and interventions noted below have been personally reviewed and/or interpreted on day of admission):   Vitals on presentation: T98.4, /54, HR 89, RR 20, SpO2 96% RA  Labs: CBC with WBC 15.3 (neutrophil predominance), Hgb 11.9, platelets 260.  CMP with glucose 173, sodium 139, potassium 4.1, BUN 28, serum creatinine 1.60, total bili 2.3.  Mag 1.49.  High-sensitivity troponin 55-42.  Flu, COVID-negative.  UA: 1+ nitrates, 500 leukocyte esterase, >50 WBCs, 1+ bacteria; urine cultures in progress.  EKG: Sinus rhythm, no overt ST-T changes indicative of acute ischemia  Imaging: CT head and C-spine  "negative for acute process; postsurgical changes noted to the C-spine.  XR of the left hip negative for acute process but does note advanced osteoarthritis.  CXR negative for acute cardiopulmonary process.  Interventions: Ceftriaxone 2 g x 1, 1 L normal saline bolus, trauma consultation, admitted to medicine    12/22: No acute complaints. Still sore. She was febrile overnight.     12/23: She states she's starting to feel better today. Bcx positive for GNR. CTX dose changed and repeat Cx ordered.          Objective     Constitutional: Pleasant and cooperative. Laying in bed in no acute distress. Conversant.   Skin: Warm and dry; no obvious lesions, rashes, pallor, or jaundice.   Eyes: EOMI. Anicteric sclera.   ENT: Mucous membranes slightly dry; no obvious injury or deformity appreciated.   Head and Neck: Normocephalic, atraumatic. ROM preserved. Trachea midline. No appreciable JVD.   Respiratory: Nonlabored on RA. Lungs clear to auscultation bilaterally without obvious adventitious sounds. Chest rise is equal.  Cardiovascular: RRR. No gross murmur, gallop, or rub. Extremities are warm and well-perfused with good capillary refill (< 3 seconds). No chest wall tenderness.   GI: Abdomen soft, nontender, nondistended. No obvious organomegaly appreciated. Prior surgical scar well-appearing.   : No CVA tenderness.   MSK: No gross abnormalities appreciated. No limitations to AROM/PROM appreciated. Global weakness.   Extremities: No cyanosis, edema, or clubbing evident. Neurovascularly intact.   Neuro: A&Ox3. CN 2-12 grossly intact. Able to respond to questions appropriately and clearly. No acute focal neurologic deficits appreciated.  Psych: Appropriate mood and behavior.    Last Recorded Vitals  Blood pressure 128/67, pulse 84, temperature 36.4 °C (97.6 °F), temperature source Temporal, resp. rate 18, height 1.676 m (5' 6\"), weight 126 kg (278 lb 3.5 oz), SpO2 93%.  Intake/Output last 3 Shifts:  I/O last 3 completed " shifts:  In: 2587.7 (20.5 mL/kg) [P.O.:711; I.V.:1876.7 (14.9 mL/kg)]  Out: 1400 (11.1 mL/kg) [Urine:1400 (0.3 mL/kg/hr)]  Weight: 126.2 kg     Relevant Results            This patient currently has cardiac telemetry ordered; if you would like to modify or discontinue the telemetry order, click here to go to the orders activity to modify/discontinue the order.                 Assessment/Plan   Sepsis without shock 2/2 GNR bacteremia, UTI source  -SIRS criteria (2/4): WBCs, HR   -End-organ dysfunction: SHANTANU  -12/21 Bcx: GNR   -12/23 Bcx: pending   -12/21 Ucx normal jacy/contaminated, repeat pending  -Follow fever curve, WBCs  -Continue antibiotic coverage with ceftriaxone D3     Acute Kidney Injury  -Baseline serum creatinine: ~0.5-0.6  -Creatinine at admission: 1.60, resolved with volume resuscitation     Rhabdomyolysis 2/2 prolonged down-time after fall at home   -CK improving with volume     Elevated troponin, suspect demand ischemia   -Pt with HSTI 55 --> 42   -No CP or anginal equivalents   -No acute ischemic changes on EKG   -Just had negative cardiac stress testing with Dr. Mazariegos on 12/18/24   -Continue home therapies. Monitor on tele.   -Suspect demand-mediated from sepsis, SHANTANU.      Accidental fall in home with head injury, no LOC, on Eliquis   -No prodromal symptoms   -No traumatic injuries on imaging   -Trauma consultation appreciated   -Fall precautions, PT/OT     Hypomagnesemia   -replete PRN     HTN, HLD   -Hold home Losartan with SHANTANU   -OK to cautiously resume home metop with hold parameters   -Continue to monitor closely      IST   -OK to cautiously resume home metop with hold parameters   -CV: Dr. Mazariegos      Asthma without acute exacerbation   (+)hx recent RSV  -Continue home inhaler   -Pulm hygiene / supportive care      DM-II   -Hold home oral meds for now   -Continue with SSI ACHS   -Accucheks, hypoglycemic protocol   -Monitor and adjust as needed       H/o DVT   -Continue Eliquis       OA, cLBP  h/o cervical cord compression s/p C5-6 ACDF (04/19/24 - [facility])  -Continue any/all home therapies as appropriate   -PT/OT  -Pain control   -Continue outpatient follow-up with [facility] as scheduled      Acute on chronic gait instability, physical deconditioning   -PT/OT  -Fall precautions   -SW/CM consultations appreciated      Morbid Obesity (BMI 44.41)   -Patient does not meet morbid/severe criteria for obesity on admission   -Continued outpatient follow-up with PCP, healthy dietary and lifestyle changes as able    -Is working on weight loss; notably has been on ozempic outpatient; ?contribution to poor appetite and intake       Yg Malhotra MD PhD

## 2024-12-23 NOTE — SIGNIFICANT EVENT
MD messaged regarding pt w/ +bcx for GNRs. no hx MDRO's, currently HDS. will change CTX to 2g for bacteremia dosing.

## 2024-12-23 NOTE — CARE PLAN
The patient's goals for the shift include remain free of falls    The clinical goals for the shift include pt. will remain free from falls throughout shift      Problem: Pain - Adult  Goal: Verbalizes/displays adequate comfort level or baseline comfort level  Outcome: Progressing     Problem: Safety - Adult  Goal: Free from fall injury  Outcome: Progressing     Problem: Discharge Planning  Goal: Discharge to home or other facility with appropriate resources  Outcome: Progressing     Problem: Chronic Conditions and Co-morbidities  Goal: Patient's chronic conditions and co-morbidity symptoms are monitored and maintained or improved  Outcome: Progressing     Problem: Skin  Goal: Decreased wound size/increased tissue granulation at next dressing change  Outcome: Progressing  Flowsheets (Taken 12/23/2024 1604)  Decreased wound size/increased tissue granulation at next dressing change:   Promote sleep for wound healing   Protective dressings over bony prominences  Goal: Participates in plan/prevention/treatment measures  Outcome: Progressing  Flowsheets (Taken 12/23/2024 1604)  Participates in plan/prevention/treatment measures:   Discuss with provider PT/OT consult   Elevate heels  Goal: Prevent/manage excess moisture  Outcome: Progressing  Flowsheets (Taken 12/23/2024 1604)  Prevent/manage excess moisture: Cleanse incontinence/protect with barrier cream  Goal: Prevent/minimize sheer/friction injuries  Outcome: Progressing  Flowsheets (Taken 12/23/2024 1604)  Prevent/minimize sheer/friction injuries: Increase activity/out of bed for meals  Goal: Promote/optimize nutrition  Outcome: Progressing  Flowsheets (Taken 12/23/2024 1604)  Promote/optimize nutrition: Consume > 50% meals/supplements  Goal: Promote skin healing  Outcome: Progressing  Flowsheets (Taken 12/23/2024 1604)  Promote skin healing: Assess skin/pad under line(s)/device(s)

## 2024-12-23 NOTE — CARE PLAN
Problem: Pain - Adult  Goal: Verbalizes/displays adequate comfort level or baseline comfort level  Outcome: Progressing     Problem: Safety - Adult  Goal: Free from fall injury  Outcome: Progressing     Problem: Discharge Planning  Goal: Discharge to home or other facility with appropriate resources  Outcome: Progressing     Problem: Chronic Conditions and Co-morbidities  Goal: Patient's chronic conditions and co-morbidity symptoms are monitored and maintained or improved  Outcome: Progressing     Problem: Skin  Goal: Decreased wound size/increased tissue granulation at next dressing change  Outcome: Progressing  Flowsheets (Taken 12/23/2024 0018)  Decreased wound size/increased tissue granulation at next dressing change: Promote sleep for wound healing  Goal: Participates in plan/prevention/treatment measures  Outcome: Progressing  Flowsheets (Taken 12/23/2024 0018)  Participates in plan/prevention/treatment measures: Elevate heels  Goal: Prevent/manage excess moisture  Outcome: Progressing  Flowsheets (Taken 12/23/2024 0018)  Prevent/manage excess moisture: Follow provider orders for dressing changes  Goal: Prevent/minimize sheer/friction injuries  Outcome: Progressing  Flowsheets (Taken 12/23/2024 0018)  Prevent/minimize sheer/friction injuries: HOB 30 degrees or less  Goal: Promote/optimize nutrition  Outcome: Progressing  Flowsheets (Taken 12/23/2024 0018)  Promote/optimize nutrition: Monitor/record intake including meals  Goal: Promote skin healing  Outcome: Progressing  Flowsheets (Taken 12/23/2024 0018)  Promote skin healing: Assess skin/pad under line(s)/device(s)

## 2024-12-23 NOTE — PROGRESS NOTES
Physical Therapy  Physical Therapy Treatment    Patient Name: Mary Dao  MRN: [id number]  Department: [address]  Room: 2022/2022-A  Today's Date: 12/23/2024  Time Calculation  Start Time: 1122  Stop Time: 1203  Time Calculation (min): 41 min    PT Plan  Treatment/Interventions: Bed mobility, Transfer training, Gait training, Neuromuscular re-education, Therapeutic exercise, Therapeutic activity  PT Plan: Ongoing PT  PT Frequency: 4 times per week  PT Discharge Recommendations: Moderate intensity level of continued care  PT Recommended Transfer Status: Assist x2, Total assist  PT - OK to Discharge: Yes    General Visit Information:   PT  Visit  PT Received On: 12/23/24  Response to Previous Treatment: Patient with no complaints from previous session.  General  Reason for Referral: Impaired mobility    Precautions:  Falls    Pain:  No pain    Cognition:  Oriented X4    Activity Tolerance:  Activity Tolerance  Endurance: Tolerates 10 - 20 min exercise with multiple rests    Treatments:  Bed Mobility  Supine to sitting: Moderate assistance  Scooting: Moderate assistance    Transfers  Sit to stand: Contact guard  Stand to sit: Contact guard  X4 total reps sit<>stand    Ambulation/Gait Training  40 feet and 80 feet with Rolator, CGA  3-4 minute seated rest between reps        Pt remained sitting in bedside chair following tx. Alarm on and call light in reach.      Outcome Measures:  [facility] Basic Mobility  Turning from your back to your side while in a flat bed without using bedrails: A lot  Moving from lying on your back to sitting on the side of a flat bed without using bedrails: A lot  Moving to and from bed to chair (including a wheelchair): A little  Standing up from a chair using your arms (e.g. wheelchair or bedside chair): A little  To walk in hospital room: A little  Climbing 3-5 steps with railing: A lot  Basic Mobility - Total Score: 15    Education Documentation  Mobility Training, taught by Yfn Duarte, PTA  at 12/23/2024  1:05 PM.  Learner: Patient  Readiness: Acceptance  Method: Explanation, Demonstration  Response: Verbalizes Understanding    Education Comments  No comments found.        OP EDUCATION:       Encounter Problems       Encounter Problems (Active)       Balance       pt will maintain upright sitting balance unsupported (no retropulsion) and keep feet on floor for safe transition to standing (Progressing)       Start:  12/22/24    Expected End:  01/05/25               PT Transfers       STG - Patient will demonstrate safe transfer techniques bed <>chair/BSC until able to safely stand/amb (Progressing)       Start:  12/22/24    Expected End:  01/05/25            STG - Patient will transfer sit to and from stand with no more than 1 person assist (Progressing)       Start:  12/22/24    Expected End:  01/05/25               Pain - Adult

## 2024-12-23 NOTE — PROGRESS NOTES
"   12/23/24 1426   Discharge Planning   Living Arrangements Children   Support Systems Children   Assistance Needed minimal   Type of Residence Private residence   Home or Post Acute Services None   Expected Discharge Disposition Home   Housing Stability   At any time in the past 12 months, were you homeless or living in a shelter (including now)? N   Transportation Needs   In the past 12 months, has lack of transportation kept you from meetings, work, or from getting things needed for daily living? No   Intensity of Service   Intensity of Service >30 min     I spoke with patient to introduce discharge planning and explain role of TCC. Pt states she lives with her daughter at home and is fairly independent.  She utilizes a rollator and has a lift chair.  She denies any falls and tells me that she has a NP 'Sharmila\" that visits the home through  medical group but under a Dr. Tripp.  She tells me that her daughter takes her twice a week to PT in [place].  Pt prefers to return home and continue with OP PT.   PT  worked with her today and told me that pt did really well and that she should do pretty well going home.  ADOD depends on negative culture results (pending).    "

## 2024-12-24 LAB
ANION GAP SERPL CALC-SCNC: 10 MMOL/L (ref 10–20)
BACTERIA BLD AEROBE CULT: ABNORMAL
BACTERIA BLD CULT: ABNORMAL
BACTERIA UR CULT: NO GROWTH
BUN SERPL-MCNC: 10 MG/DL (ref 6–23)
CALCIUM SERPL-MCNC: 7.8 MG/DL (ref 8.6–10.3)
CHLORIDE SERPL-SCNC: 106 MMOL/L (ref 98–107)
CK SERPL-CCNC: 470 U/L (ref 0–215)
CO2 SERPL-SCNC: 28 MMOL/L (ref 21–32)
CREAT SERPL-MCNC: 0.72 MG/DL (ref 0.5–1.05)
EGFRCR SERPLBLD CKD-EPI 2021: 88 ML/MIN/1.73M*2
GLUCOSE BLD MANUAL STRIP-MCNC: 120 MG/DL (ref 74–99)
GLUCOSE BLD MANUAL STRIP-MCNC: 153 MG/DL (ref 74–99)
GLUCOSE BLD MANUAL STRIP-MCNC: 165 MG/DL (ref 74–99)
GLUCOSE BLD MANUAL STRIP-MCNC: 185 MG/DL (ref 74–99)
GLUCOSE SERPL-MCNC: 164 MG/DL (ref 74–99)
GRAM STN SPEC: ABNORMAL
HOLD SPECIMEN: NORMAL
MAGNESIUM SERPL-MCNC: 1.36 MG/DL (ref 1.6–2.4)
POTASSIUM SERPL-SCNC: 3.7 MMOL/L (ref 3.5–5.3)
SODIUM SERPL-SCNC: 140 MMOL/L (ref 136–145)

## 2024-12-24 PROCEDURE — 2500000004 HC RX 250 GENERAL PHARMACY W/ HCPCS (ALT 636 FOR OP/ED): Performed by: INTERNAL MEDICINE

## 2024-12-24 PROCEDURE — 80048 BASIC METABOLIC PNL TOTAL CA: CPT | Performed by: INTERNAL MEDICINE

## 2024-12-24 PROCEDURE — 2500000001 HC RX 250 WO HCPCS SELF ADMINISTERED DRUGS (ALT 637 FOR MEDICARE OP): Performed by: STUDENT IN AN ORGANIZED HEALTH CARE EDUCATION/TRAINING PROGRAM

## 2024-12-24 PROCEDURE — 2500000002 HC RX 250 W HCPCS SELF ADMINISTERED DRUGS (ALT 637 FOR MEDICARE OP, ALT 636 FOR OP/ED): Performed by: STUDENT IN AN ORGANIZED HEALTH CARE EDUCATION/TRAINING PROGRAM

## 2024-12-24 PROCEDURE — 82550 ASSAY OF CK (CPK): CPT | Performed by: INTERNAL MEDICINE

## 2024-12-24 PROCEDURE — 99233 SBSQ HOSP IP/OBS HIGH 50: CPT | Performed by: INTERNAL MEDICINE

## 2024-12-24 PROCEDURE — 97116 GAIT TRAINING THERAPY: CPT | Mod: CQ,GP

## 2024-12-24 PROCEDURE — 2500000005 HC RX 250 GENERAL PHARMACY W/O HCPCS: Performed by: STUDENT IN AN ORGANIZED HEALTH CARE EDUCATION/TRAINING PROGRAM

## 2024-12-24 PROCEDURE — 82947 ASSAY GLUCOSE BLOOD QUANT: CPT

## 2024-12-24 PROCEDURE — 36415 COLL VENOUS BLD VENIPUNCTURE: CPT | Performed by: INTERNAL MEDICINE

## 2024-12-24 PROCEDURE — 83735 ASSAY OF MAGNESIUM: CPT | Performed by: INTERNAL MEDICINE

## 2024-12-24 PROCEDURE — 2500000001 HC RX 250 WO HCPCS SELF ADMINISTERED DRUGS (ALT 637 FOR MEDICARE OP): Performed by: INTERNAL MEDICINE

## 2024-12-24 PROCEDURE — 2500000004 HC RX 250 GENERAL PHARMACY W/ HCPCS (ALT 636 FOR OP/ED): Performed by: STUDENT IN AN ORGANIZED HEALTH CARE EDUCATION/TRAINING PROGRAM

## 2024-12-24 PROCEDURE — 1200000002 HC GENERAL ROOM WITH TELEMETRY DAILY

## 2024-12-24 RX ORDER — ALBUTEROL SULFATE 90 UG/1
2 INHALANT RESPIRATORY (INHALATION) EVERY 4 HOURS PRN
Status: DISCONTINUED | OUTPATIENT
Start: 2024-12-24 | End: 2024-12-26 | Stop reason: HOSPADM

## 2024-12-24 RX ORDER — BUDESONIDE AND FORMOTEROL FUMARATE DIHYDRATE 160; 4.5 UG/1; UG/1
2 AEROSOL RESPIRATORY (INHALATION)
Status: DISCONTINUED | OUTPATIENT
Start: 2024-12-24 | End: 2024-12-26 | Stop reason: HOSPADM

## 2024-12-24 RX ORDER — MAGNESIUM SULFATE HEPTAHYDRATE 40 MG/ML
4 INJECTION, SOLUTION INTRAVENOUS ONCE
Status: COMPLETED | OUTPATIENT
Start: 2024-12-24 | End: 2024-12-24

## 2024-12-24 RX ORDER — BENZONATATE 100 MG/1
200 CAPSULE ORAL 3 TIMES DAILY PRN
Status: DISCONTINUED | OUTPATIENT
Start: 2024-12-24 | End: 2024-12-24

## 2024-12-24 RX ORDER — BENZONATATE 100 MG/1
100 CAPSULE ORAL 3 TIMES DAILY PRN
Status: DISCONTINUED | OUTPATIENT
Start: 2024-12-24 | End: 2024-12-26 | Stop reason: HOSPADM

## 2024-12-24 RX ADMIN — APIXABAN 5 MG: 5 TABLET, FILM COATED ORAL at 08:48

## 2024-12-24 RX ADMIN — MONTELUKAST 10 MG: 10 TABLET, FILM COATED ORAL at 08:48

## 2024-12-24 RX ADMIN — BUDESONIDE AND FORMOTEROL FUMARATE DIHYDRATE 2 PUFF: 160; 4.5 AEROSOL RESPIRATORY (INHALATION) at 18:59

## 2024-12-24 RX ADMIN — SODIUM CHLORIDE, POTASSIUM CHLORIDE, SODIUM LACTATE AND CALCIUM CHLORIDE 100 ML/HR: 600; 310; 30; 20 INJECTION, SOLUTION INTRAVENOUS at 06:37

## 2024-12-24 RX ADMIN — BENZONATATE 100 MG: 100 CAPSULE ORAL at 20:51

## 2024-12-24 RX ADMIN — METOPROLOL TARTRATE 25 MG: 25 TABLET, FILM COATED ORAL at 08:48

## 2024-12-24 RX ADMIN — METOPROLOL TARTRATE 25 MG: 25 TABLET, FILM COATED ORAL at 20:52

## 2024-12-24 RX ADMIN — Medication 3 MG: at 20:51

## 2024-12-24 RX ADMIN — MAGNESIUM SULFATE HEPTAHYDRATE 4 G: 40 INJECTION, SOLUTION INTRAVENOUS at 08:49

## 2024-12-24 RX ADMIN — CEFTRIAXONE SODIUM 2 G: 2 INJECTION, SOLUTION INTRAVENOUS at 22:41

## 2024-12-24 RX ADMIN — BUDESONIDE AND FORMOTEROL FUMARATE DIHYDRATE 2 PUFF: 160; 4.5 AEROSOL RESPIRATORY (INHALATION) at 10:54

## 2024-12-24 RX ADMIN — PANTOPRAZOLE SODIUM 40 MG: 40 TABLET, DELAYED RELEASE ORAL at 08:48

## 2024-12-24 RX ADMIN — INSULIN LISPRO 2 UNITS: 100 INJECTION, SOLUTION INTRAVENOUS; SUBCUTANEOUS at 12:30

## 2024-12-24 RX ADMIN — APIXABAN 5 MG: 5 TABLET, FILM COATED ORAL at 20:51

## 2024-12-24 ASSESSMENT — COGNITIVE AND FUNCTIONAL STATUS - GENERAL
CLIMB 3 TO 5 STEPS WITH RAILING: TOTAL
EATING MEALS: A LITTLE
MOVING FROM LYING ON BACK TO SITTING ON SIDE OF FLAT BED WITH BEDRAILS: A LITTLE
CLIMB 3 TO 5 STEPS WITH RAILING: A LOT
DRESSING REGULAR LOWER BODY CLOTHING: A LITTLE
TOILETING: A LITTLE
MOVING TO AND FROM BED TO CHAIR: A LITTLE
TOILETING: A LITTLE
STANDING UP FROM CHAIR USING ARMS: A LITTLE
MOBILITY SCORE: 14
CLIMB 3 TO 5 STEPS WITH RAILING: A LOT
WALKING IN HOSPITAL ROOM: A LOT
PERSONAL GROOMING: A LITTLE
MOBILITY SCORE: 15
TURNING FROM BACK TO SIDE WHILE IN FLAT BAD: A LITTLE
PERSONAL GROOMING: A LITTLE
STANDING UP FROM CHAIR USING ARMS: A LOT
STANDING UP FROM CHAIR USING ARMS: A LOT
HELP NEEDED FOR BATHING: A LITTLE
DRESSING REGULAR UPPER BODY CLOTHING: A LITTLE
MOBILITY SCORE: 15
TURNING FROM BACK TO SIDE WHILE IN FLAT BAD: A LITTLE
DAILY ACTIVITIY SCORE: 18
TURNING FROM BACK TO SIDE WHILE IN FLAT BAD: A LOT
MOVING FROM LYING ON BACK TO SITTING ON SIDE OF FLAT BED WITH BEDRAILS: A LITTLE
MOVING TO AND FROM BED TO CHAIR: A LITTLE
DRESSING REGULAR LOWER BODY CLOTHING: A LITTLE
MOVING FROM LYING ON BACK TO SITTING ON SIDE OF FLAT BED WITH BEDRAILS: A LOT
EATING MEALS: A LITTLE
HELP NEEDED FOR BATHING: A LITTLE
WALKING IN HOSPITAL ROOM: A LOT
WALKING IN HOSPITAL ROOM: A LITTLE
DAILY ACTIVITIY SCORE: 18
MOVING TO AND FROM BED TO CHAIR: A LITTLE
DRESSING REGULAR UPPER BODY CLOTHING: A LITTLE

## 2024-12-24 ASSESSMENT — PAIN SCALES - GENERAL
PAINLEVEL_OUTOF10: 0 - NO PAIN
PAINLEVEL_OUTOF10: 0 - NO PAIN

## 2024-12-24 NOTE — NURSING NOTE
Patient arrived to unit. [place] to room and staff. Patient verbalized understanding of call light system and appropriate use. Skin assessed and remain intact.  Continuous pulse ox maintained, room air, no sign of respiratory distress noted. All need at this time have been met. Pt resting comfortably, bed in lowest position and call light within reach.

## 2024-12-24 NOTE — CARE PLAN
Problem: Pain - Adult  Goal: Verbalizes/displays adequate comfort level or baseline comfort level  Outcome: Progressing     Problem: Safety - Adult  Goal: Free from fall injury  Outcome: Progressing     Problem: Discharge Planning  Goal: Discharge to home or other facility with appropriate resources  Outcome: Progressing     Problem: Chronic Conditions and Co-morbidities  Goal: Patient's chronic conditions and co-morbidity symptoms are monitored and maintained or improved  Outcome: Progressing     Problem: Skin  Goal: Decreased wound size/increased tissue granulation at next dressing change  Outcome: Progressing  Flowsheets (Taken 12/23/2024 2334)  Decreased wound size/increased tissue granulation at next dressing change: Promote sleep for wound healing  Goal: Participates in plan/prevention/treatment measures  Outcome: Progressing  Flowsheets (Taken 12/23/2024 2334)  Participates in plan/prevention/treatment measures: Discuss with provider PT/OT consult  Goal: Prevent/manage excess moisture  Outcome: Progressing  Flowsheets (Taken 12/23/2024 2334)  Prevent/manage excess moisture: Cleanse incontinence/protect with barrier cream  Goal: Prevent/minimize sheer/friction injuries  Outcome: Progressing  Flowsheets (Taken 12/23/2024 2334)  Prevent/minimize sheer/friction injuries: Increase activity/out of bed for meals  Goal: Promote/optimize nutrition  Outcome: Progressing  Flowsheets (Taken 12/23/2024 2334)  Promote/optimize nutrition: Monitor/record intake including meals  Goal: Promote skin healing  Outcome: Progressing  Flowsheets (Taken 12/23/2024 2334)  Promote skin healing: Turn/reposition every 2 hours/use positioning/transfer devices

## 2024-12-24 NOTE — PROGRESS NOTES
Patient transferred to room 3304 via bed.  LR infusing at 100 ml/hr.  No acute distress noted at this time.

## 2024-12-24 NOTE — PROGRESS NOTES
Physical Therapy  Physical Therapy Treatment    Patient Name: Mary Dao  MRN: [id number]  Department: [address]  Room: [address] [place], [place] [address]  Today's Date: 12/24/2024  Time Calculation  Start Time: 1138  Stop Time: 1202  Time Calculation (min): 24 min    PT Plan  Treatment/Interventions: Bed mobility, Transfer training, Gait training, Neuromuscular re-education, Therapeutic exercise, Therapeutic activity  PT Plan: Ongoing PT  PT Frequency: 4 times per week  PT Discharge Recommendations: Low intensity level of continued care (ADDENDUM; recomm changed from MOD to LOW on 12/23 for rapid improvement mobility)  PT Recommended Transfer Status: Assist x2, Total assist  PT - OK to Discharge: Yes    General Visit Information:   PT  Visit  PT Received On: 12/24/24  Response to Previous Treatment: Patient with no complaints from previous session.  General  Reason for Referral: Impaired mobility    Precautions:  Falls    Pain:  No pain    Cognition:  Oriented X4    Treatments:  Bed Mobility  Supine to sitting: Moderate assistance  Scooting: Moderate assistance    Transfers  Sit to stand: Contact guard  Stand to sit: Contact guard    Ambulation/Gait Training  60 feet x1 rep, 100 feet x1 rep with FWW, CGA        Pt sitting in bedside chair following tx. alarm on and call light in reach.        Outcome Measures:  [facility] Basic Mobility  Turning from your back to your side while in a flat bed without using bedrails: A lot  Moving from lying on your back to sitting on the side of a flat bed without using bedrails: A lot  Moving to and from bed to chair (including a wheelchair): A little  Standing up from a chair using your arms (e.g. wheelchair or bedside chair): A little  To walk in hospital room: A little  Climbing 3-5 steps with railing: Total  Basic Mobility - Total Score: 14    Education Documentation  Mobility Training, taught by Yfn Duarte PTA at 12/24/2024  1:24 PM.  Learner: Patient  Readiness: Acceptance  Method: Explanation,  Demonstration  Response: Verbalizes Understanding    Education Comments  No comments found.        OP EDUCATION:       Encounter Problems       Encounter Problems (Active)       Balance       pt will maintain upright sitting balance unsupported (no retropulsion) and keep feet on floor for safe transition to standing (Progressing)       Start:  12/22/24    Expected End:  01/05/25               PT Transfers       STG - Patient will demonstrate safe transfer techniques bed <>chair/BSC until able to safely stand/amb (Progressing)       Start:  12/22/24    Expected End:  01/05/25            STG - Patient will transfer sit to and from stand with no more than 1 person assist (Progressing)       Start:  12/22/24    Expected End:  01/05/25               Pain - Adult

## 2024-12-24 NOTE — PROGRESS NOTES
Social work consult placed for discharge planning. SW reviewed pt's chart and communicated with TCC. SW met with pt and pt's daughter at bedside to assess needs and provide support, introduced self and my role as  with care transition team. Pt inquired about transportation resources available to assist her in getting to outpatient PT appointments. Pt stated her daughter usually leaves work early to take her but inquiring about what resources may be readily available. SW provided pt/pt's daughter with [facility] non emergent transportation resources guide and encouraged pt to look into [employer] for assistance in getting to follow up appointments. Pt/ pt's daughter appreciative of SW support. No other needs identified at this time, SW signing off,  pt and care team aware of SW availability while inpt.    MAEVE Gomez ([id number])   Care Transitions

## 2024-12-24 NOTE — PROGRESS NOTES
Mary Dao is a 74 y.o. female on day 3 of admission presenting with Cystitis.    Subjective   Mary Dao is a 74 y.o. female with PMHx s/f HTN, HLD, IST, asthma, allergic rhinitis, prior DVT on Eliquis (2.5mg BID), T2DM, OA, cLBP, h/o cervical cord compression s/p C5-6 ACDF (04/19/24 - [facility]), chronic gait instability (with use of walker at home), presenting after a fall at home. Pt reports that she was at home and trying to reach for something, falling out of her recliner/chair ~0200 morning of admission (12/21/24). She did hit her head, she did not lose consciousness. This was unwitnessed. She was on the ground for quite some time; was unable to get up by herself d/t chronic hip pain. Notably, she had RSV followed by persistent symptoms (was treated for either the flu or possible PNA --? was started on doxycycline), but has been feeling overall unwell. Has had nausea, a few episodes of NBNB emesis (since she initially became sick), has had poor appetite and intake, and just feels overall poor. She has had some generalized abdominal discomfort. Denies cp/pressure, palpitations, diaphoresis, SOB, SEAMAN, dizziness / lightheadedness, syncope or near syncope, HA, vision changes, fever, chills, focal and/or lateralizing sensory or motor deficits.       ED Course (Summary - please note all labs, imaging studies, and interventions noted below have been personally reviewed and/or interpreted on day of admission):   Vitals on presentation: T98.4, /54, HR 89, RR 20, SpO2 96% RA  Labs: CBC with WBC 15.3 (neutrophil predominance), Hgb 11.9, platelets 260.  CMP with glucose 173, sodium 139, potassium 4.1, BUN 28, serum creatinine 1.60, total bili 2.3.  Mag 1.49.  High-sensitivity troponin 55-42.  Flu, COVID-negative.  UA: 1+ nitrates, 500 leukocyte esterase, >50 WBCs, 1+ bacteria; urine cultures in progress.  EKG: Sinus rhythm, no overt ST-T changes indicative of acute ischemia  Imaging: CT head and C-spine  "negative for acute process; postsurgical changes noted to the C-spine.  XR of the left hip negative for acute process but does note advanced osteoarthritis.  CXR negative for acute cardiopulmonary process.  Interventions: Ceftriaxone 2 g x 1, 1 L normal saline bolus, trauma consultation, admitted to medicine    12/22: No acute complaints. Still sore. She was febrile overnight.     12/23: She states she's starting to feel better today. Bcx positive for GNR. CTX dose changed and repeat Cx ordered.     12/24: Epistaxis this AM. Resolved by the time I saw her on rounds.          Objective     Constitutional: Pleasant and cooperative. Laying in bed in no acute distress. Conversant.   Skin: Warm and dry; no obvious lesions, rashes, pallor, or jaundice.   Eyes: EOMI. Anicteric sclera.   ENT: Mucous membranes slightly dry; no obvious injury or deformity appreciated.   Head and Neck: Normocephalic, atraumatic. ROM preserved. Trachea midline. No appreciable JVD.   Respiratory: Nonlabored on RA. Lungs clear to auscultation bilaterally without obvious adventitious sounds. Chest rise is equal.  Cardiovascular: RRR. No gross murmur, gallop, or rub. Extremities are warm and well-perfused with good capillary refill (< 3 seconds). No chest wall tenderness.   GI: Abdomen soft, nontender, nondistended. No obvious organomegaly appreciated. Prior surgical scar well-appearing.   : No CVA tenderness.   MSK: No gross abnormalities appreciated. No limitations to AROM/PROM appreciated. Global weakness.   Extremities: No cyanosis, edema, or clubbing evident. Neurovascularly intact.   Neuro: A&Ox3. CN 2-12 grossly intact. Able to respond to questions appropriately and clearly. No acute focal neurologic deficits appreciated.  Psych: Appropriate mood and behavior.    Last Recorded Vitals  Blood pressure (!) 143/101, pulse 96, temperature 36.5 °C (97.7 °F), temperature source Temporal, resp. rate 19, height 1.676 m (5' 6\"), weight 126 kg (278 " lb 3.5 oz), SpO2 94%.  Intake/Output last 3 Shifts:  I/O last 3 completed shifts:  In: 2290 (18.1 mL/kg) [I.V.:2190 (17.4 mL/kg); IV Piggyback:100]  Out: 1900 (15.1 mL/kg) [Urine:1900 (0.4 mL/kg/hr)]  Weight: 126.2 kg     Relevant Results                              Assessment/Plan   Sepsis without shock 2/2 GNR bacteremia, UTI source  -SIRS criteria (2/4): WBCs, HR   -End-organ dysfunction: SHANTANU  -12/21 Bcx: Proteus (resistant to tetracyclines)   -12/23 Bcx: pending   -12/21 Ucx normal jacy/contaminated, 12/23 repeat pending  -Follow fever curve, WBCs  -Continue antibiotic coverage with ceftriaxone     Acute Kidney Injury  -Baseline serum creatinine: ~0.5-0.6  -Creatinine at admission: 1.60, resolved with volume resuscitation     Rhabdomyolysis 2/2 prolonged down-time after fall at home   -CK improving with volume     Elevated troponin, suspect demand ischemia   -Pt with HSTI 55 --> 42   -No CP or anginal equivalents   -No acute ischemic changes on EKG   -Just had negative cardiac stress testing with Dr. Mazariegos on 12/18/24   -Continue home therapies. Monitor on tele.   -Suspect demand-mediated from sepsis, SHANTANU.      Accidental fall in home with head injury, no LOC, on Eliquis   -No prodromal symptoms   -No traumatic injuries on imaging   -Trauma consultation appreciated   -Fall precautions, PT/OT     Hypomagnesemia   -replete PRN     HTN, HLD   -Hold home Losartan with SHANTANU   -OK to cautiously resume home metop with hold parameters   -Continue to monitor closely      IST   -OK to cautiously resume home metop with hold parameters   -CV: Dr. Mazariegos      Asthma without acute exacerbation   (+)hx recent RSV  -Continue home inhaler   -Pulm hygiene / supportive care      DM-II   -Hold home oral meds for now   -Continue with SSI ACHS   -Accucheks, hypoglycemic protocol   -Monitor and adjust as needed       H/o DVT   -Continue Eliquis      OA, cLBP  h/o cervical cord compression s/p C5-6 ACDF (04/19/24 -  [facility])  -Continue any/all home therapies as appropriate   -PT/OT  -Pain control   -Continue outpatient follow-up with [facility] as scheduled      Acute on chronic gait instability, physical deconditioning   -PT/OT  -Fall precautions   -SW/CM consultations appreciated      Morbid Obesity (BMI 44.41)   -Patient does not meet morbid/severe criteria for obesity on admission   -Continued outpatient follow-up with PCP, healthy dietary and lifestyle changes as able    -Is working on weight loss; notably has been on ozempic outpatient; ?contribution to poor appetite and intake       Yg Malhotra MD PhD

## 2024-12-25 LAB
ANION GAP SERPL CALC-SCNC: 11 MMOL/L (ref 10–20)
BUN SERPL-MCNC: 9 MG/DL (ref 6–23)
CALCIUM SERPL-MCNC: 8 MG/DL (ref 8.6–10.3)
CHLORIDE SERPL-SCNC: 104 MMOL/L (ref 98–107)
CK SERPL-CCNC: 224 U/L (ref 0–215)
CO2 SERPL-SCNC: 29 MMOL/L (ref 21–32)
CREAT SERPL-MCNC: 0.69 MG/DL (ref 0.5–1.05)
EGFRCR SERPLBLD CKD-EPI 2021: >90 ML/MIN/1.73M*2
GLUCOSE BLD MANUAL STRIP-MCNC: 138 MG/DL (ref 74–99)
GLUCOSE BLD MANUAL STRIP-MCNC: 148 MG/DL (ref 74–99)
GLUCOSE BLD MANUAL STRIP-MCNC: 156 MG/DL (ref 74–99)
GLUCOSE BLD MANUAL STRIP-MCNC: 164 MG/DL (ref 74–99)
GLUCOSE SERPL-MCNC: 153 MG/DL (ref 74–99)
MAGNESIUM SERPL-MCNC: 1.7 MG/DL (ref 1.6–2.4)
POTASSIUM SERPL-SCNC: 3.9 MMOL/L (ref 3.5–5.3)
SODIUM SERPL-SCNC: 140 MMOL/L (ref 136–145)

## 2024-12-25 PROCEDURE — 2500000001 HC RX 250 WO HCPCS SELF ADMINISTERED DRUGS (ALT 637 FOR MEDICARE OP): Performed by: STUDENT IN AN ORGANIZED HEALTH CARE EDUCATION/TRAINING PROGRAM

## 2024-12-25 PROCEDURE — 36415 COLL VENOUS BLD VENIPUNCTURE: CPT | Performed by: INTERNAL MEDICINE

## 2024-12-25 PROCEDURE — 2500000004 HC RX 250 GENERAL PHARMACY W/ HCPCS (ALT 636 FOR OP/ED): Performed by: STUDENT IN AN ORGANIZED HEALTH CARE EDUCATION/TRAINING PROGRAM

## 2024-12-25 PROCEDURE — 99233 SBSQ HOSP IP/OBS HIGH 50: CPT | Performed by: INTERNAL MEDICINE

## 2024-12-25 PROCEDURE — 80048 BASIC METABOLIC PNL TOTAL CA: CPT | Performed by: INTERNAL MEDICINE

## 2024-12-25 PROCEDURE — 2500000002 HC RX 250 W HCPCS SELF ADMINISTERED DRUGS (ALT 637 FOR MEDICARE OP, ALT 636 FOR OP/ED): Performed by: STUDENT IN AN ORGANIZED HEALTH CARE EDUCATION/TRAINING PROGRAM

## 2024-12-25 PROCEDURE — 1210000001 HC SEMI-PRIVATE ROOM DAILY

## 2024-12-25 PROCEDURE — 2500000001 HC RX 250 WO HCPCS SELF ADMINISTERED DRUGS (ALT 637 FOR MEDICARE OP): Performed by: INTERNAL MEDICINE

## 2024-12-25 PROCEDURE — 83735 ASSAY OF MAGNESIUM: CPT | Performed by: STUDENT IN AN ORGANIZED HEALTH CARE EDUCATION/TRAINING PROGRAM

## 2024-12-25 PROCEDURE — 82550 ASSAY OF CK (CPK): CPT | Performed by: STUDENT IN AN ORGANIZED HEALTH CARE EDUCATION/TRAINING PROGRAM

## 2024-12-25 PROCEDURE — 82947 ASSAY GLUCOSE BLOOD QUANT: CPT

## 2024-12-25 RX ADMIN — ACETAMINOPHEN 650 MG: 325 TABLET ORAL at 20:55

## 2024-12-25 RX ADMIN — METOPROLOL TARTRATE 25 MG: 25 TABLET, FILM COATED ORAL at 20:56

## 2024-12-25 RX ADMIN — ALBUTEROL SULFATE 2 PUFF: 90 AEROSOL, METERED RESPIRATORY (INHALATION) at 21:10

## 2024-12-25 RX ADMIN — INSULIN LISPRO 2 UNITS: 100 INJECTION, SOLUTION INTRAVENOUS; SUBCUTANEOUS at 09:14

## 2024-12-25 RX ADMIN — APIXABAN 5 MG: 5 TABLET, FILM COATED ORAL at 09:12

## 2024-12-25 RX ADMIN — ALBUTEROL SULFATE 2 PUFF: 90 AEROSOL, METERED RESPIRATORY (INHALATION) at 10:43

## 2024-12-25 RX ADMIN — BUDESONIDE AND FORMOTEROL FUMARATE DIHYDRATE 2 PUFF: 160; 4.5 AEROSOL RESPIRATORY (INHALATION) at 10:42

## 2024-12-25 RX ADMIN — BENZONATATE 100 MG: 100 CAPSULE ORAL at 20:57

## 2024-12-25 RX ADMIN — MONTELUKAST 10 MG: 10 TABLET, FILM COATED ORAL at 09:12

## 2024-12-25 RX ADMIN — APIXABAN 5 MG: 5 TABLET, FILM COATED ORAL at 20:56

## 2024-12-25 RX ADMIN — PANTOPRAZOLE SODIUM 40 MG: 40 TABLET, DELAYED RELEASE ORAL at 09:12

## 2024-12-25 RX ADMIN — CEFTRIAXONE SODIUM 2 G: 2 INJECTION, SOLUTION INTRAVENOUS at 22:48

## 2024-12-25 RX ADMIN — METOPROLOL TARTRATE 25 MG: 25 TABLET, FILM COATED ORAL at 09:11

## 2024-12-25 RX ADMIN — BENZONATATE 100 MG: 100 CAPSULE ORAL at 10:43

## 2024-12-25 RX ADMIN — INSULIN LISPRO 2 UNITS: 100 INJECTION, SOLUTION INTRAVENOUS; SUBCUTANEOUS at 12:27

## 2024-12-25 RX ADMIN — BUDESONIDE AND FORMOTEROL FUMARATE DIHYDRATE 2 PUFF: 160; 4.5 AEROSOL RESPIRATORY (INHALATION) at 21:07

## 2024-12-25 ASSESSMENT — COGNITIVE AND FUNCTIONAL STATUS - GENERAL
PERSONAL GROOMING: A LITTLE
TURNING FROM BACK TO SIDE WHILE IN FLAT BAD: A LITTLE
TOILETING: A LITTLE
HELP NEEDED FOR BATHING: A LITTLE
DAILY ACTIVITIY SCORE: 18
MOVING FROM LYING ON BACK TO SITTING ON SIDE OF FLAT BED WITH BEDRAILS: A LITTLE
TURNING FROM BACK TO SIDE WHILE IN FLAT BAD: A LITTLE
MOVING FROM LYING ON BACK TO SITTING ON SIDE OF FLAT BED WITH BEDRAILS: A LITTLE
PERSONAL GROOMING: A LITTLE
DAILY ACTIVITIY SCORE: 18
DRESSING REGULAR LOWER BODY CLOTHING: A LITTLE
MOBILITY SCORE: 16
CLIMB 3 TO 5 STEPS WITH RAILING: A LOT
STANDING UP FROM CHAIR USING ARMS: A LOT
EATING MEALS: A LITTLE
DRESSING REGULAR UPPER BODY CLOTHING: A LITTLE
TOILETING: A LITTLE
WALKING IN HOSPITAL ROOM: A LITTLE
DRESSING REGULAR LOWER BODY CLOTHING: A LITTLE
DRESSING REGULAR UPPER BODY CLOTHING: A LITTLE
EATING MEALS: A LITTLE
WALKING IN HOSPITAL ROOM: A LOT
MOVING TO AND FROM BED TO CHAIR: A LITTLE
HELP NEEDED FOR BATHING: A LITTLE
STANDING UP FROM CHAIR USING ARMS: A LOT
MOBILITY SCORE: 15
MOVING TO AND FROM BED TO CHAIR: A LITTLE
CLIMB 3 TO 5 STEPS WITH RAILING: A LOT

## 2024-12-25 ASSESSMENT — PAIN SCALES - GENERAL
PAINLEVEL_OUTOF10: 0 - NO PAIN
PAINLEVEL_OUTOF10: 3
PAINLEVEL_OUTOF10: 0 - NO PAIN

## 2024-12-25 ASSESSMENT — PAIN DESCRIPTION - LOCATION: LOCATION: LEG

## 2024-12-25 ASSESSMENT — PAIN - FUNCTIONAL ASSESSMENT
PAIN_FUNCTIONAL_ASSESSMENT: 0-10
PAIN_FUNCTIONAL_ASSESSMENT: 0-10

## 2024-12-25 NOTE — CARE PLAN
Problem: Pain - Adult  Goal: Verbalizes/displays adequate comfort level or baseline comfort level  Outcome: Progressing     Problem: Safety - Adult  Goal: Free from fall injury  Outcome: Progressing     Problem: Chronic Conditions and Co-morbidities  Goal: Patient's chronic conditions and co-morbidity symptoms are monitored and maintained or improved  Outcome: Progressing   The patient's goals for the shift include remain free of falls    The clinical goals for the shift include Patien remain neuro intact this shift.

## 2024-12-25 NOTE — PROGRESS NOTES
Mary Dao is a 74 y.o. female on day 4 of admission presenting with Cystitis.    Subjective   Mary Dao is a 74 y.o. female with PMHx s/f HTN, HLD, IST, asthma, allergic rhinitis, prior DVT on Eliquis (2.5mg BID), T2DM, OA, cLBP, h/o cervical cord compression s/p C5-6 ACDF (04/19/24 - [facility]), chronic gait instability (with use of walker at home), presenting after a fall at home. Pt reports that she was at home and trying to reach for something, falling out of her recliner/chair ~0200 morning of admission (12/21/24). She did hit her head, she did not lose consciousness. This was unwitnessed. She was on the ground for quite some time; was unable to get up by herself d/t chronic hip pain. Notably, she had RSV followed by persistent symptoms (was treated for either the flu or possible PNA --? was started on doxycycline), but has been feeling overall unwell. Has had nausea, a few episodes of NBNB emesis (since she initially became sick), has had poor appetite and intake, and just feels overall poor. She has had some generalized abdominal discomfort. Denies cp/pressure, palpitations, diaphoresis, SOB, SEAMAN, dizziness / lightheadedness, syncope or near syncope, HA, vision changes, fever, chills, focal and/or lateralizing sensory or motor deficits.       ED Course (Summary - please note all labs, imaging studies, and interventions noted below have been personally reviewed and/or interpreted on day of admission):   Vitals on presentation: T98.4, /54, HR 89, RR 20, SpO2 96% RA  Labs: CBC with WBC 15.3 (neutrophil predominance), Hgb 11.9, platelets 260.  CMP with glucose 173, sodium 139, potassium 4.1, BUN 28, serum creatinine 1.60, total bili 2.3.  Mag 1.49.  High-sensitivity troponin 55-42.  Flu, COVID-negative.  UA: 1+ nitrates, 500 leukocyte esterase, >50 WBCs, 1+ bacteria; urine cultures in progress.  EKG: Sinus rhythm, no overt ST-T changes indicative of acute ischemia  Imaging: CT head and C-spine  negative for acute process; postsurgical changes noted to the C-spine.  XR of the left hip negative for acute process but does note advanced osteoarthritis.  CXR negative for acute cardiopulmonary process.  Interventions: Ceftriaxone 2 g x 1, 1 L normal saline bolus, trauma consultation, admitted to medicine    12/22: No acute complaints. Still sore. She was febrile overnight.     12/23: She states she's starting to feel better today. Bcx positive for GNR. CTX dose changed and repeat Cx ordered.     12/24: Epistaxis this AM. Resolved by the time I saw her on rounds.     12/25: She is without complaint.          Objective     Constitutional: Pleasant and cooperative. Laying in bed in no acute distress. Conversant.   Skin: Warm and dry; no obvious lesions, rashes, pallor, or jaundice.   Eyes: EOMI. Anicteric sclera.   ENT: Mucous membranes slightly dry; no obvious injury or deformity appreciated.   Head and Neck: Normocephalic, atraumatic. ROM preserved. Trachea midline. No appreciable JVD.   Respiratory: Nonlabored on RA. Lungs clear to auscultation bilaterally without obvious adventitious sounds. Chest rise is equal.  Cardiovascular: RRR. No gross murmur, gallop, or rub. Extremities are warm and well-perfused with good capillary refill (< 3 seconds). No chest wall tenderness.   GI: Abdomen soft, nontender, nondistended. No obvious organomegaly appreciated. Prior surgical scar well-appearing.   : No CVA tenderness.   MSK: No gross abnormalities appreciated. No limitations to AROM/PROM appreciated. Global weakness.   Extremities: No cyanosis, edema, or clubbing evident. Neurovascularly intact.   Neuro: A&Ox3. CN 2-12 grossly intact. Able to respond to questions appropriately and clearly. No acute focal neurologic deficits appreciated.  Psych: Appropriate mood and behavior.    Last Recorded Vitals  Blood pressure 153/78, pulse 93, temperature 36.7 °C (98.1 °F), temperature source Temporal, resp. rate 17, height 1.676  "reilly (5' 6\"), weight 126 kg (278 lb 3.5 oz), SpO2 94%.  Intake/Output last 3 Shifts:  I/O last 3 completed shifts:  In: 1821.7 (14.4 mL/kg) [P.O.:240; I.V.:1431.7 (11.3 mL/kg); IV Piggyback:150]  Out: 2250 (17.8 mL/kg) [Urine:2250 (0.5 mL/kg/hr)]  Weight: 126.2 kg     Relevant Results                              Assessment/Plan   Sepsis without shock 2/2 Proteus bacteremia, UTI source  -SIRS criteria (2/4): WBCs, HR   -End-organ dysfunction: SHANTANU  -12/21 Bcx: Proteus (resistant to tetracyclines)   -12/23 Bcx: NG D1   -12/21 Ucx normal jacy/contaminated, 12/23 repeat with no growth  -Follow fever curve, WBCs  -Continue antibiotic coverage with ceftriaxone  -If the 12/23 Bcx remain negative plan to DC on cefdinir 300 mg BID with a stop date of 1/6/25 to complete a two week course from 12/23.      Acute Kidney Injury  -Baseline serum creatinine: ~0.5-0.6  -Creatinine at admission: 1.60, resolved with volume resuscitation     Rhabdomyolysis 2/2 prolonged down-time after fall at home   -CK improving with volume     Elevated troponin, suspect demand ischemia   -Pt with HSTI 55 --> 42   -No CP or anginal equivalents   -No acute ischemic changes on EKG   -Just had negative cardiac stress testing with Dr. Mazariegos on 12/18/24   -Continue home therapies. Monitor on tele.   -Suspect demand-mediated from sepsis, SHANTANU.      Accidental fall in home with head injury, no LOC, on Eliquis   -No prodromal symptoms   -No traumatic injuries on imaging   -Trauma consultation appreciated   -Fall precautions, PT/OT     Hypomagnesemia   -replete PRN     HTN, HLD   -Hold home Losartan with SHANTANU   -OK to cautiously resume home metop with hold parameters   -Continue to monitor closely      IST   -OK to cautiously resume home metop with hold parameters   -CV: Dr. Mazariegos      Asthma without acute exacerbation   (+)hx recent RSV  -Continue home inhaler   -Pulm hygiene / supportive care      DM-II   -Hold home oral meds for now   -Continue " with SSI ACHS   -Accucheks, hypoglycemic protocol   -Monitor and adjust as needed       H/o DVT   -Continue Eliquis      OA, cLBP  h/o cervical cord compression s/p C5-6 ACDF (04/19/24 - [facility])  -Continue any/all home therapies as appropriate   -PT/OT  -Pain control   -Continue outpatient follow-up with [facility] as scheduled      Acute on chronic gait instability, physical deconditioning   -PT/OT  -Fall precautions   -SW/CM consultations appreciated      Morbid Obesity (BMI 44.41)   -Patient does not meet morbid/severe criteria for obesity on admission   -Continued outpatient follow-up with PCP, healthy dietary and lifestyle changes as able    -Is working on weight loss; notably has been on ozempic outpatient; ?contribution to poor appetite and intake       Yg Malhotra MD PhD

## 2024-12-25 NOTE — DOCUMENTATION CLARIFICATION NOTE
"    PATIENT:               JASON HAM  ACCT #:                  [id number]  MRN:                       [id number]  :                       1950  ADMIT DATE:       2024 3:55 PM  DISCH DATE:  RESPONDING PROVIDER #:        [id number]          PROVIDER RESPONSE TEXT:    Sepsis with multi-system organ dysfunction of SHANTANU and demand ischemia    CDI QUERY TEXT:    Clarification    Instruction:    Based on your assessment of the patient and the clinical information, please provide the requested documentation by clicking on the appropriate radio button and enter any additional information if prompted.    Question: Please further clarify if a relationship exists between the Sepsis and acute organ dysfunction    When answering this query, please exercise your independent professional judgment. The fact that a question is being asked, does not imply that any particular answer is desired or expected.    The patient's clinical indicators include:  Clinical Information: Patient admitted with UTI with noted sepsis, SHANTANU, and demand ischemia    Clinical Indicators: Per H and P, \"Sepsis without shock 2/2 UTI  -SIRS criteria (): WBCs, HR  -Source: as above  -End-organ dysfunction: SHANTANU  Elevated troponin, suspect demand ischemia  -Suspect demand-mediated from sepsis, SHANTANU. Unless change in symptoms or clinical condition will hold off on additional eval given lack of symptoms and recent neg testing.\"    : T 98.4-100.4, P 91-92, R 20-22, BP 83/64, satting 94 percent on RA, WBC 15.2, abs neutrophils 12.77, BUN/Creat 28/1.60    24  Troponin I, High Sensitivity: 55  24  Troponin I, High Sensitivity: 42    Treatment: 1000cc NS bolus, IV rocephin- active, trending trops, monitoring kidney functions, monitoring labs/vitals    Risk Factors: 74yof admitted with UTI with noted sepsis, SHANTANU, and demand ischemia  Options provided:  -- Sepsis with multi-system organ dysfunction of SHANTANU and demand ischemia  -- Sepsis with " other organ dysfunction, Please specify sepsis associated organ dysfunction below  -- Other - I will add my own diagnosis  -- Refer to Clinical Documentation Reviewer    Query created by: Gaviota Ludwig on 12/24/2024 1:51 PM      Electronically signed by:  ALEXX MOORE MD PhD 12/25/2024 10:13 AM

## 2024-12-25 NOTE — DOCUMENTATION CLARIFICATION NOTE
"    PATIENT:               JASON HAM  ACCT #:                  [id number]  MRN:                       [id number]  :                       1950  ADMIT DATE:       2024 3:55 PM  DISCH DATE:  RESPONDING PROVIDER #:        [id number]          PROVIDER RESPONSE TEXT:    Traumatic Rhabdomyolysis    CDI QUERY TEXT:    Clarification    Instruction:    Based on your assessment of the patient and the clinical information, please provide the requested documentation by clicking on the appropriate radio button and enter any additional information if prompted.    Question: Please clarify the type of Rhabdomyolysis as    When answering this query, please exercise your independent professional judgment. The fact that a question is being asked, does not imply that any particular answer is desired or expected.    The patient's clinical indicators include:  Clinical Information: Patient with noted rhabdo s/p fall with prolonged down-time    Clinical Indicators: Per H and P, \"Rhabdomyolysis 2/2 prolonged down-time after fall at home.\"     CK: 533   CK: 876   CK: 632   CK: 470    Treatment: 1000cc NS bolus, monitoring CK levels    Risk Factors: 74yof with noted rhabdo s/p fall with prolonged down-time  Options provided:  -- Traumatic Rhabdomyolysis  -- Non-traumatic Rhabdomyolysis  -- Other - I will add my own diagnosis  -- Refer to Clinical Documentation Reviewer    Query created by: Gaviota Ludwig on 2024 1:59 PM      Electronically signed by:  ALEXX MOORE MD PhD 2024 10:13 AM          "

## 2024-12-26 ENCOUNTER — PHARMACY VISIT (OUTPATIENT)
Dept: PHARMACY | Facility: CLINIC | Age: 74
End: 2024-12-26
Payer: MEDICARE

## 2024-12-26 VITALS
HEIGHT: 66 IN | DIASTOLIC BLOOD PRESSURE: 82 MMHG | HEART RATE: 84 BPM | BODY MASS INDEX: 44.71 KG/M2 | OXYGEN SATURATION: 93 % | RESPIRATION RATE: 16 BRPM | TEMPERATURE: 97.8 F | SYSTOLIC BLOOD PRESSURE: 168 MMHG | WEIGHT: 278.22 LBS

## 2024-12-26 PROBLEM — N30.90 CYSTITIS: Status: RESOLVED | Noted: 2024-12-21 | Resolved: 2024-12-26

## 2024-12-26 LAB
BACTERIA BLD AEROBE CULT: ABNORMAL
BACTERIA BLD CULT: ABNORMAL
GLUCOSE BLD MANUAL STRIP-MCNC: 145 MG/DL (ref 74–99)
GLUCOSE BLD MANUAL STRIP-MCNC: 148 MG/DL (ref 74–99)
GRAM STN SPEC: ABNORMAL

## 2024-12-26 PROCEDURE — 2500000001 HC RX 250 WO HCPCS SELF ADMINISTERED DRUGS (ALT 637 FOR MEDICARE OP): Performed by: STUDENT IN AN ORGANIZED HEALTH CARE EDUCATION/TRAINING PROGRAM

## 2024-12-26 PROCEDURE — 99239 HOSP IP/OBS DSCHRG MGMT >30: CPT | Performed by: INTERNAL MEDICINE

## 2024-12-26 PROCEDURE — RXMED WILLOW AMBULATORY MEDICATION CHARGE

## 2024-12-26 PROCEDURE — 82947 ASSAY GLUCOSE BLOOD QUANT: CPT

## 2024-12-26 PROCEDURE — 2500000001 HC RX 250 WO HCPCS SELF ADMINISTERED DRUGS (ALT 637 FOR MEDICARE OP): Performed by: INTERNAL MEDICINE

## 2024-12-26 RX ORDER — CEFDINIR 300 MG/1
300 CAPSULE ORAL 2 TIMES DAILY
Qty: 24 CAPSULE | Refills: 0 | Status: SHIPPED | OUTPATIENT
Start: 2024-12-26 | End: 2025-01-07

## 2024-12-26 RX ADMIN — BUDESONIDE AND FORMOTEROL FUMARATE DIHYDRATE 2 PUFF: 160; 4.5 AEROSOL RESPIRATORY (INHALATION) at 08:46

## 2024-12-26 RX ADMIN — BENZONATATE 100 MG: 100 CAPSULE ORAL at 08:47

## 2024-12-26 RX ADMIN — APIXABAN 5 MG: 5 TABLET, FILM COATED ORAL at 08:46

## 2024-12-26 RX ADMIN — METOPROLOL TARTRATE 25 MG: 25 TABLET, FILM COATED ORAL at 08:45

## 2024-12-26 RX ADMIN — PANTOPRAZOLE SODIUM 40 MG: 40 TABLET, DELAYED RELEASE ORAL at 08:46

## 2024-12-26 RX ADMIN — ACETAMINOPHEN 650 MG: 325 TABLET ORAL at 08:58

## 2024-12-26 RX ADMIN — MONTELUKAST 10 MG: 10 TABLET, FILM COATED ORAL at 08:45

## 2024-12-26 ASSESSMENT — COGNITIVE AND FUNCTIONAL STATUS - GENERAL
DAILY ACTIVITIY SCORE: 20
CLIMB 3 TO 5 STEPS WITH RAILING: A LOT
WALKING IN HOSPITAL ROOM: A LOT
DRESSING REGULAR UPPER BODY CLOTHING: A LITTLE
STANDING UP FROM CHAIR USING ARMS: A LITTLE
DRESSING REGULAR LOWER BODY CLOTHING: A LITTLE
MOBILITY SCORE: 16
TOILETING: A LITTLE
MOVING TO AND FROM BED TO CHAIR: A LITTLE
HELP NEEDED FOR BATHING: A LITTLE
MOVING FROM LYING ON BACK TO SITTING ON SIDE OF FLAT BED WITH BEDRAILS: A LITTLE
TURNING FROM BACK TO SIDE WHILE IN FLAT BAD: A LITTLE

## 2024-12-26 ASSESSMENT — PAIN SCALES - GENERAL
PAINLEVEL_OUTOF10: 3

## 2024-12-26 ASSESSMENT — PAIN - FUNCTIONAL ASSESSMENT
PAIN_FUNCTIONAL_ASSESSMENT: 0-10
PAIN_FUNCTIONAL_ASSESSMENT: 0-10

## 2024-12-26 NOTE — CARE PLAN
Problem: Pain - Adult  Goal: Verbalizes/displays adequate comfort level or baseline comfort level  Outcome: Progressing     Problem: Safety - Adult  Goal: Free from fall injury  Outcome: Progressing     Problem: Discharge Planning  Goal: Discharge to home or other facility with appropriate resources  Outcome: Progressing     Problem: Chronic Conditions and Co-morbidities  Goal: Patient's chronic conditions and co-morbidity symptoms are monitored and maintained or improved  Outcome: Progressing     Problem: Skin  Goal: Decreased wound size/increased tissue granulation at next dressing change  Outcome: Progressing  Flowsheets (Taken 12/26/2024 0112)  Decreased wound size/increased tissue granulation at next dressing change: Protective dressings over bony prominences  Goal: Participates in plan/prevention/treatment measures  Outcome: Progressing  Flowsheets (Taken 12/26/2024 0112)  Participates in plan/prevention/treatment measures:   Elevate heels   Increase activity/out of bed for meals  Goal: Prevent/manage excess moisture  Outcome: Progressing  Flowsheets (Taken 12/26/2024 0112)  Prevent/manage excess moisture: Monitor for/manage infection if present  Goal: Prevent/minimize sheer/friction injuries  Outcome: Progressing  Flowsheets (Taken 12/26/2024 0112)  Prevent/minimize sheer/friction injuries:   Increase activity/out of bed for meals   Turn/reposition every 2 hours/use positioning/transfer devices  Goal: Promote/optimize nutrition  Outcome: Progressing  Flowsheets (Taken 12/26/2024 0112)  Promote/optimize nutrition:   Assist with feeding   Monitor/record intake including meals  Goal: Promote skin healing  Outcome: Progressing  Flowsheets (Taken 12/26/2024 0112)  Promote skin healing:   Turn/reposition every 2 hours/use positioning/transfer devices   Assess skin/pad under line(s)/device(s)     Problem: Diabetes  Goal: Achieve decreasing blood glucose levels by end of shift  Outcome: Progressing  Goal: Increase  stability of blood glucose readings by end of shift  Outcome: Progressing  Goal: Decrease in ketones present in urine by end of shift  Outcome: Progressing  Goal: Maintain electrolyte levels within acceptable range throughout shift  Outcome: Progressing  Goal: Maintain glucose levels >70mg/dl to <250mg/dl throughout shift  Outcome: Progressing  Goal: No changes in neurological exam by end of shift  Outcome: Progressing  Goal: Learn about and adhere to nutrition recommendations by end of shift  Outcome: Progressing  Goal: Vital signs within normal range for age by end of shift  Outcome: Progressing  Goal: Increase self care and/or family involovement by end of shift  Outcome: Progressing  Goal: Receive DSME education by end of shift  Outcome: Progressing

## 2024-12-26 NOTE — PROGRESS NOTES
12/26/24 0940   12/26/24 0940   Discharge Planning   Expected Discharge Disposition Home   Intensity of Service   Intensity of Service 0-30 min     Spoke with pt. Pt from home and lives in a house with daughter. Pt confirmed that preferred plan is to return home with out pt PT. Pt declined [facility]. Pt is hoping to be discharged today. Reviewed IM with pt. Pt verbalized understanding and signed IM. Copy left at bedside.   Catherine Castillo RN, BSN, [place]/ [place] [facility] Supervisor

## 2024-12-26 NOTE — CARE PLAN
Problem: Pain - Adult  Goal: Verbalizes/displays adequate comfort level or baseline comfort level  Outcome: Progressing     Problem: Safety - Adult  Goal: Free from fall injury  Outcome: Progressing     Problem: Discharge Planning  Goal: Discharge to home or other facility with appropriate resources  Outcome: Progressing     Problem: Chronic Conditions and Co-morbidities  Goal: Patient's chronic conditions and co-morbidity symptoms are monitored and maintained or improved  Outcome: Progressing     Problem: Skin  Goal: Decreased wound size/increased tissue granulation at next dressing change  Outcome: Progressing  Goal: Participates in plan/prevention/treatment measures  Outcome: Progressing  Goal: Prevent/manage excess moisture  Outcome: Progressing  Goal: Prevent/minimize sheer/friction injuries  Outcome: Progressing  Goal: Promote/optimize nutrition  Outcome: Progressing  Goal: Promote skin healing  Outcome: Progressing     Problem: Diabetes  Goal: Achieve decreasing blood glucose levels by end of shift  Outcome: Progressing  Goal: Increase stability of blood glucose readings by end of shift  Outcome: Progressing  Goal: Decrease in ketones present in urine by end of shift  Outcome: Progressing  Goal: Maintain electrolyte levels within acceptable range throughout shift  Outcome: Progressing  Goal: Maintain glucose levels >70mg/dl to <250mg/dl throughout shift  Outcome: Progressing  Goal: No changes in neurological exam by end of shift  Outcome: Progressing  Goal: Learn about and adhere to nutrition recommendations by end of shift  Outcome: Progressing  Goal: Vital signs within normal range for age by end of shift  Outcome: Progressing  Goal: Increase self care and/or family involovement by end of shift  Outcome: Progressing  Goal: Receive DSME education by end of shift  Outcome: Progressing         The patient's goals for the shift include remain free of falls    The clinical goals for the shift include patient  will remain safe and free of fall and injury throughout the shift.

## 2024-12-26 NOTE — DISCHARGE SUMMARY
Discharge Diagnosis  Proteus bacteremia/sepsis secondary to Proteus UTI  SHANTANU  Acute rhabdo  Non-MI elevation of troponin  Hypomagnesemia      This discharge took greater than 35 minutes.    Test Results Pending At Discharge  Pending Labs       Order Current Status    Blood Culture Preliminary result    Blood Culture Preliminary result            Hospital Course   Mary Dao is a 74 y.o. female with PMHx s/f HTN, HLD, IST, asthma, allergic rhinitis, prior DVT on Eliquis (2.5mg BID), T2DM, OA, cLBP, h/o cervical cord compression s/p C5-6 ACDF (04/19/24 - [facility]), chronic gait instability (with use of walker at home), presenting after a fall at home. Pt reports that she was at home and trying to reach for something, falling out of her recliner/chair ~0200 morning of admission (12/21/24). She did hit her head, she did not lose consciousness. This was unwitnessed. She was on the ground for quite some time; was unable to get up by herself d/t chronic hip pain. Notably, she had RSV followed by persistent symptoms (was treated for either the flu or possible PNA --? was started on doxycycline), but has been feeling overall unwell. Has had nausea, a few episodes of NBNB emesis (since she initially became sick), has had poor appetite and intake, and just feels overall poor. She has had some generalized abdominal discomfort. Denies cp/pressure, palpitations, diaphoresis, SOB, SEAMAN, dizziness / lightheadedness, syncope or near syncope, HA, vision changes, fever, chills, focal and/or lateralizing sensory or motor deficits.       ED Course (Summary - please note all labs, imaging studies, and interventions noted below have been personally reviewed and/or interpreted on day of admission):   Vitals on presentation: T98.4, /54, HR 89, RR 20, SpO2 96% RA  Labs: CBC with WBC 15.3 (neutrophil predominance), Hgb 11.9, platelets 260.  CMP with glucose 173, sodium 139, potassium 4.1, BUN 28, serum creatinine 1.60, total bili 2.3.   Mag 1.49.  High-sensitivity troponin 55-42.  Flu, COVID-negative.  UA: 1+ nitrates, 500 leukocyte esterase, >50 WBCs, 1+ bacteria; urine cultures in progress.  EKG: Sinus rhythm, no overt ST-T changes indicative of acute ischemia  Imaging: CT head and C-spine negative for acute process; postsurgical changes noted to the C-spine.  XR of the left hip negative for acute process but does note advanced osteoarthritis.  CXR negative for acute cardiopulmonary process.  Interventions: Ceftriaxone 2 g x 1, 1 L normal saline bolus, trauma consultation, admitted to medicine     12/22: No acute complaints. Still sore. She was febrile overnight.      12/23: She states she's starting to feel better today. Bcx positive for GNR. CTX dose changed and repeat Cx ordered.      12/24: Epistaxis this AM. Resolved by the time I saw her on rounds.      12/25: She is without complaint.   12/26: Patient condition significantly improved and back to her baseline.  Repeat blood cultures negative for more than 48 hours.  Patient will be discharged home on oral cefdinir for 12 more days to complete 14 days treatment after negative blood cultures.  She will follow-up with her PCP as an outpatient.    Pertinent Physical Exam At Time of Discharge  Physical Exam    Home Medications     Medication List      START taking these medications     cefdinir 300 mg capsule; Commonly known as: Omnicef; Take 1 capsule (300   mg) by mouth 2 times a day for 12 days.     CONTINUE taking these medications     * albuterol 2.5 mg /3 mL (0.083 %) nebulizer solution   * albuterol 90 mcg/actuation inhaler   baclofen 10 mg tablet; Commonly known as: Lioresal   benzonatate 100 mg capsule; Commonly known as: Tessalon   cetirizine 10 mg tablet; Commonly known as: ZyrTEC   DULoxetine 60 mg DR capsule; Commonly known as: Cymbalta   Eliquis 5 mg tablet; Generic drug: apixaban   ergocalciferol 1.25 MG (44876 UT) capsule; Commonly known as: Vitamin   D-2   famotidine 20 mg  tablet; Commonly known as: Pepcid   losartan 25 mg tablet; Commonly known as: Cozaar   metoprolol tartrate 25 mg tablet; Commonly known as: Lopressor   montelukast 10 mg tablet; Commonly known as: Singulair   ondansetron ODT 4 mg disintegrating tablet; Commonly known as:   Zofran-ODT   Ozempic 0.25 mg or 0.5 mg (2 mg/3 mL) pen injector; Generic drug:   semaglutide   Symbicort 160-4.5 mcg/actuation inhaler; Generic drug:   budesonide-formoteroL   traMADol 50 mg tablet; Commonly known as: Ultram  * This list has 2 medication(s) that are the same as other medications   prescribed for you. Read the directions carefully, and ask your doctor or   other care provider to review them with you.       Outpatient Follow-Up  No follow-ups on file.     Jazmyn Ho MD  12/26/2024  11:44 AM

## 2024-12-27 LAB
BACTERIA BLD CULT: NORMAL
BACTERIA BLD CULT: NORMAL

## 2025-03-27 ENCOUNTER — HOSPITAL ENCOUNTER (OUTPATIENT)
Dept: RADIOLOGY | Facility: CLINIC | Age: 75
End: 2025-03-27
Payer: MEDICARE

## 2025-03-27 ENCOUNTER — APPOINTMENT (OUTPATIENT)
Dept: RADIOLOGY | Facility: CLINIC | Age: 75
End: 2025-03-27
Payer: MEDICARE

## 2025-04-09 ENCOUNTER — LAB (OUTPATIENT)
Dept: LAB | Facility: HOSPITAL | Age: 75
End: 2025-04-09
Payer: COMMERCIAL

## 2025-04-10 ENCOUNTER — HOSPITAL ENCOUNTER (OUTPATIENT)
Dept: RADIOLOGY | Facility: CLINIC | Age: 75
Discharge: HOME | End: 2025-04-10
Payer: MEDICARE

## 2025-04-10 VITALS — BODY MASS INDEX: 44.68 KG/M2 | WEIGHT: 278 LBS | HEIGHT: 66 IN

## 2025-04-10 DIAGNOSIS — Z12.31 ENCOUNTER FOR SCREENING MAMMOGRAM FOR MALIGNANT NEOPLASM OF BREAST: ICD-10-CM

## 2025-04-10 DIAGNOSIS — M81.0 AGE-RELATED OSTEOPOROSIS WITHOUT CURRENT PATHOLOGICAL FRACTURE: ICD-10-CM

## 2025-04-10 LAB
25(OH)D3+25(OH)D2 SERPL-MCNC: 52 NG/ML (ref 30–100)
ALBUMIN SERPL-MCNC: 4.2 G/DL (ref 3.6–5.1)
ALP SERPL-CCNC: 83 U/L (ref 37–153)
ALT SERPL-CCNC: 12 U/L (ref 6–29)
ANION GAP SERPL CALCULATED.4IONS-SCNC: 12 MMOL/L (CALC) (ref 7–17)
AST SERPL-CCNC: 13 U/L (ref 10–35)
BASOPHILS # BLD AUTO: 28 CELLS/UL (ref 0–200)
BASOPHILS NFR BLD AUTO: 0.3 %
BILIRUB SERPL-MCNC: 1.1 MG/DL (ref 0.2–1.2)
BUN SERPL-MCNC: 18 MG/DL (ref 7–25)
CALCIUM SERPL-MCNC: 9.1 MG/DL (ref 8.6–10.4)
CHLORIDE SERPL-SCNC: 106 MMOL/L (ref 98–110)
CHOLEST SERPL-MCNC: 181 MG/DL
CHOLEST/HDLC SERPL: 3.4 (CALC)
CO2 SERPL-SCNC: 24 MMOL/L (ref 20–32)
CREAT SERPL-MCNC: 0.68 MG/DL (ref 0.6–1)
EGFRCR SERPLBLD CKD-EPI 2021: 91 ML/MIN/1.73M2
EOSINOPHIL # BLD AUTO: 156 CELLS/UL (ref 15–500)
EOSINOPHIL NFR BLD AUTO: 1.7 %
ERYTHROCYTE [DISTWIDTH] IN BLOOD BY AUTOMATED COUNT: 14 % (ref 11–15)
EST. AVERAGE GLUCOSE BLD GHB EST-MCNC: 111 MG/DL
EST. AVERAGE GLUCOSE BLD GHB EST-SCNC: 6.2 MMOL/L
GLUCOSE SERPL-MCNC: 138 MG/DL (ref 65–99)
HBA1C MFR BLD: 5.5 % OF TOTAL HGB
HCT VFR BLD AUTO: 41.4 % (ref 35–45)
HDLC SERPL-MCNC: 53 MG/DL
HGB BLD-MCNC: 13.2 G/DL (ref 11.7–15.5)
LDLC SERPL CALC-MCNC: 104 MG/DL (CALC)
LYMPHOCYTES # BLD AUTO: 2125 CELLS/UL (ref 850–3900)
LYMPHOCYTES NFR BLD AUTO: 23.1 %
MCH RBC QN AUTO: 27.4 PG (ref 27–33)
MCHC RBC AUTO-ENTMCNC: 31.9 G/DL (ref 32–36)
MCV RBC AUTO: 85.9 FL (ref 80–100)
MONOCYTES # BLD AUTO: 350 CELLS/UL (ref 200–950)
MONOCYTES NFR BLD AUTO: 3.8 %
NEUTROPHILS # BLD AUTO: 6541 CELLS/UL (ref 1500–7800)
NEUTROPHILS NFR BLD AUTO: 71.1 %
NONHDLC SERPL-MCNC: 128 MG/DL (CALC)
PLATELET # BLD AUTO: 333 THOUSAND/UL (ref 140–400)
PMV BLD REES-ECKER: 9.8 FL (ref 7.5–12.5)
POTASSIUM SERPL-SCNC: 4.3 MMOL/L (ref 3.5–5.3)
PROT SERPL-MCNC: 6.4 G/DL (ref 6.1–8.1)
RBC # BLD AUTO: 4.82 MILLION/UL (ref 3.8–5.1)
SODIUM SERPL-SCNC: 142 MMOL/L (ref 135–146)
TRIGL SERPL-MCNC: 144 MG/DL
TSH SERPL-ACNC: 0.71 MIU/L (ref 0.4–4.5)
VIT B12 SERPL-MCNC: 277 PG/ML (ref 200–1100)
WBC # BLD AUTO: 9.2 THOUSAND/UL (ref 3.8–10.8)

## 2025-04-10 PROCEDURE — 77067 SCR MAMMO BI INCL CAD: CPT

## 2025-04-10 PROCEDURE — 77080 DXA BONE DENSITY AXIAL: CPT | Performed by: RADIOLOGY

## 2025-04-10 PROCEDURE — 77063 BREAST TOMOSYNTHESIS BI: CPT

## 2025-04-10 PROCEDURE — 77080 DXA BONE DENSITY AXIAL: CPT

## 2025-04-16 ENCOUNTER — APPOINTMENT (OUTPATIENT)
Facility: CLINIC | Age: 75
End: 2025-04-16
Payer: COMMERCIAL

## 2025-04-16 VITALS
HEIGHT: 66 IN | SYSTOLIC BLOOD PRESSURE: 123 MMHG | DIASTOLIC BLOOD PRESSURE: 79 MMHG | BODY MASS INDEX: 41.46 KG/M2 | WEIGHT: 258 LBS | HEART RATE: 109 BPM | OXYGEN SATURATION: 96 %

## 2025-04-16 DIAGNOSIS — Z12.11 SCREENING FOR COLON CANCER: Primary | ICD-10-CM

## 2025-04-16 PROCEDURE — 3008F BODY MASS INDEX DOCD: CPT | Performed by: INTERNAL MEDICINE

## 2025-04-16 PROCEDURE — 1036F TOBACCO NON-USER: CPT | Performed by: INTERNAL MEDICINE

## 2025-04-16 PROCEDURE — 3074F SYST BP LT 130 MM HG: CPT | Performed by: INTERNAL MEDICINE

## 2025-04-16 PROCEDURE — 3078F DIAST BP <80 MM HG: CPT | Performed by: INTERNAL MEDICINE

## 2025-04-16 PROCEDURE — 1160F RVW MEDS BY RX/DR IN RCRD: CPT | Performed by: INTERNAL MEDICINE

## 2025-04-16 PROCEDURE — 1159F MED LIST DOCD IN RCRD: CPT | Performed by: INTERNAL MEDICINE

## 2025-04-16 PROCEDURE — 99203 OFFICE O/P NEW LOW 30 MIN: CPT | Performed by: INTERNAL MEDICINE

## 2025-04-16 PROCEDURE — 4010F ACE/ARB THERAPY RXD/TAKEN: CPT | Performed by: INTERNAL MEDICINE

## 2025-04-16 ASSESSMENT — ENCOUNTER SYMPTOMS
DYSURIA: 0
HEADACHES: 0
WHEEZING: 0
SHORTNESS OF BREATH: 0
SORE THROAT: 0
NUMBNESS: 0
UNEXPECTED WEIGHT CHANGE: 0
BACK PAIN: 1
ROS GI COMMENTS: AS DETAILED ABOVE.
FEVER: 0
CONFUSION: 0
BRUISES/BLEEDS EASILY: 0
COUGH: 0
DIZZINESS: 0
MYALGIAS: 0
CHILLS: 0
FATIGUE: 0
SEIZURES: 0
ARTHRALGIAS: 1
ADENOPATHY: 0
TROUBLE SWALLOWING: 0
NERVOUS/ANXIOUS: 0
PALPITATIONS: 0
HEMATURIA: 0
WEAKNESS: 0
VOICE CHANGE: 0

## 2025-04-16 NOTE — PATIENT INSTRUCTIONS
You have been scheduled for a colonoscopy.  You were given instructions for preparing for this test in the office today.  If you have questions about these instructions, please call my office at [phone].      You will need to stop taking your Eliquis (Apixaban) for 2 days prior to your procedure. You should talk to the physician that manages this medication (your cardiologist, primary care provider, or vascular team) to make sure that it is safe to temporarily stop this medication. You will likely be able to start taking it again the day after the procedure.       You are currently prescribed one of the Glucagon-like peptide-1 (GLP-1) agonist medications such as Dulaglutide (Trulicity), Exenatide (Byetta or Bydureon), Liraglutide (Saxenda), Liraglutide (Victoza), Lixisenatide (Adlyxin), Semaglutide (Ozempic or Wegovy or Rybelsus) or Tirzepatide (Mounjaro). The use of these medications might increase some of the risks of anesthesia such as nausea/vomiting, regurgitation, and aspiration. It is recommended that you stop taking this medication before your procedure to decrease those risks.    If you take this medication once a week then you should stop taking it for the week before your procedure. SKIP the last dose before your procedure.  If you take this medication every day then you should stop taking it the day before your procedure. SKIP the last dose before your procedure.    If you have diabetes, you should talk to your primary care provider (or whoever manages your diabetes medications) to make sure that they don't recommend any other changes to your medications around the time of your procedure. It is important to keep your diabetes controlled and to avoid any hypoglycemia (low blood sugar). Your primary care provider can help with that.      After your procedure, you can expect me to talk to you to go over the results of the procedure. If polyps were removed I will usually be able to tell you my  initial thoughts about those polyps and give you some idea of when you should have another colonoscopy.    If any polyps are removed during your procedure or if any biopsies are obtained those specimens will go to the pathologists to review under the microscope. Once those results are available they will be sent to me electronically to review. These results will also be available to you at that time through [employer]. I briefly review all of these results by the next business day to determine if there is any urgent information that needs to be communicated to you right away or anything that would significantly change what I told you at the time of the procedure. If there is nothing urgent this is usually a good sign and I will then do a more extensive review of the result and send you a letter with my final recommendations within a week of the result becoming available. If you have questions or need additional information I urge you to call the office at [phone], but I do ask for patience as I spend a good portion of each day performing procedures like the one you are scheduled for and during those times I am not able to take or return routine phone calls.    You were also given information regarding the schedule for your procedure including the time that you need to arrive to the endoscopy unit.  You will also be contacted 2-3 day prior to your procedure to confirm the final arrival time.  If you have questions about this or if you need to cancel or change this appointment please call my office at [phone].       Follow up with GI as needed.

## 2025-04-16 NOTE — PROGRESS NOTES
[facility]    ASSESSMENT and PLAN:       Mary Dao is a 75 y.o. female with a significant past medical history of PE, diastolic dysfunction, HTN, HLD, depression/anxiety, OA, back pain, obesity (BMI 42), asthma, and cervical cord compression who presents for consultation requested by her primary care provider (DELVIS Stone) for a discussion regarding colonoscopy.       Colon cancer screening  Due for screening colonoscopy.  Asymptomatic at this time.  No previous difficulties with sedation.  No antiplatelet/antithrombotic use.  - colonoscopy scheduled in OR due to BMI, mobility issues, and comorbidities  - hold Eliquis for 2 days prior to procedure (based on most recent renal function)  - hold Ozempic prior to procedure per anesthesia guideline  - OTC bowel prep discussed with patient  - instructions for procedure discussed with patient in the office today and hard copy given to patient today        Follow up with GI as needed.          Michael Noguera MD        Senior Attending Physician, Gastroenterology    [facility]    Clinical   [facility] [employer]        Subjective   HISTORY OF PRESENT ILLNESS:     Chief Complaint  Colonoscopy    History Of Present Illness:    Mary Dao is a 75 y.o. female with a significant past medical history of PE, diastolic dysfunction, HTN, HLD, depression/anxiety, OA, back pain, obesity (BMI 42), asthma, and cervical cord compression who presents for consultation requested by her primary care provider (DELVIS Stone) for a discussion regarding colonoscopy.    Her medication list includes Eliquis and Ozempic. Most recent renal function was normal.    There are no notes from her PCP in the EMR or in the scanned records. The scanned records include an order from her PCP (Rosa Alexander NP) that states that a colonoscopy was being  ordered for screening. There are no endoscopy reports in the EMR.      She says that her last colonoscopy was about 10-12 years ago and that was normal. She denies any significant GI symptoms except for some intermittent loose stool since her gallbladder was removed. She has not had any difficulties with sedation/anesthesia in the past. She is currently on Eliquis and says that her PCP recently approved her to stop that for a month around the time of a surgery. Her paternal grandfather had colon cancer, but she otherwise denies any family history of GI disease.       Patient denies any heartburn/GERD, N/V, dysphagia, odynophagia, abdominal pain, diarrhea, constipation, hematemesis, hematochezia, melena, or weight loss.      Review of systems:   Review of Systems   Constitutional:  Negative for chills, fatigue, fever and unexpected weight change.   HENT:  Negative for congestion, sneezing, sore throat, trouble swallowing and voice change.    Respiratory:  Negative for cough, shortness of breath and wheezing.    Cardiovascular:  Negative for chest pain, palpitations and leg swelling.   Gastrointestinal:         As detailed above.   Genitourinary:  Negative for dysuria and hematuria.   Musculoskeletal:  Positive for arthralgias and back pain. Negative for myalgias.   Skin:  Negative for pallor and rash.   Neurological:  Negative for dizziness, seizures, syncope, weakness, numbness and headaches.   Hematological:  Negative for adenopathy. Does not bruise/bleed easily.   Psychiatric/Behavioral:  Negative for confusion. The patient is not nervous/anxious.    All other systems reviewed and are negative.      I performed a complete 10 point review of systems and it is negative except as noted in HPI or above.      PAST HISTORIES:       Past Medical History:  Problem List[1]  She has a past medical history of Age-related nuclear cataract of right eye (08/05/2019), Other specified anxiety disorders (07/13/2018), Personal  "history of other diseases of the circulatory system (11/10/2017), Personal history of other diseases of the respiratory system (11/10/2017), Personal history of other specified conditions, Unspecified asthma, uncomplicated (HHS-HCC) (12/11/2019), and Urinary incontinence (05/12/2023).    She has no past medical history of Personal history of irradiation.    Past Surgical History:  She has a past surgical history that includes Spine surgery.      Social History:  She reports that she has never smoked. She has never used smokeless tobacco. She reports that she does not drink alcohol and does not use drugs.    Family History:  No known family history of GI disease, specifically denies any family history of pancreatitis, Crohn's, colon cancer, gastroesophageal cancer, or ulcerative colitis.    Family History[2]     Allergies:  Coffee (coffea arabica), Codeine, Dextroamphetamine, Dextromethorphan, Fluticasone propion-salmeterol, Lidocaine, Morphine, Other, Penicillins, and Sulfa (sulfonamide antibiotics)      Objective   OBJECTIVE:       Last Recorded Vitals:  Vitals:    04/16/25 0915   BP: 123/79   Pulse: 109   SpO2: 96%   Weight: 117 kg (258 lb)   Height: 1.676 m (5' 6\")     /79   Pulse 109   Ht 1.676 m (5' 6\")   Wt 117 kg (258 lb)   SpO2 96%   BMI 41.64 kg/m²      Physical Exam:    Physical Exam  Vitals reviewed.   Constitutional:       General: She is not in acute distress.     Appearance: She is obese. She is not ill-appearing.   HENT:      Head: Normocephalic and atraumatic.   Eyes:      General: No scleral icterus.  Cardiovascular:      Rate and Rhythm: Normal rate and regular rhythm.      Pulses: Normal pulses.      Heart sounds: Normal heart sounds. No murmur heard.  Pulmonary:      Effort: Pulmonary effort is normal. No respiratory distress.      Breath sounds: Normal breath sounds. No wheezing.   Abdominal:      General: Bowel sounds are normal.      Palpations: Abdomen is soft.      Tenderness: " There is no abdominal tenderness. There is no rebound.   Musculoskeletal:         General: No swelling or deformity.      Comments: Slow gait with wheeled walker   Skin:     General: Skin is warm and dry.      Coloration: Skin is not jaundiced.      Findings: No rash.   Neurological:      General: No focal deficit present.      Mental Status: She is alert and oriented to person, place, and time.   Psychiatric:         Mood and Affect: Mood normal.         Behavior: Behavior normal.         Thought Content: Thought content normal.         Judgment: Judgment normal.         Home Medications:  Prior to Admission medications    Medication Sig Start Date End Date Taking? Authorizing Provider   albuterol 2.5 mg /3 mL (0.083 %) nebulizer solution Take 3 mL (2.5 mg) by nebulization every 6 hours if needed.    Historical Provider, MD   albuterol 90 mcg/actuation inhaler Inhale 2 puffs every 4 hours if needed.    Historical Provider, MD   baclofen (Lioresal) 10 mg tablet Take 1 tablet (10 mg) by mouth once daily. 7/1/24   Historical Provider, MD   benzonatate (Tessalon) 100 mg capsule Take 1 capsule (100 mg) by mouth 3 times a day as needed for cough. Do not crush or chew.    Historical Provider, MD   budesonide-formoteroL (Symbicort) 160-4.5 mcg/actuation inhaler Inhale 2 puffs 2 times a day. Rinse mouth with water after use to reduce aftertaste and incidence of candidiasis. Do not swallow.    Historical Provider, MD   cetirizine (ZyrTEC) 10 mg tablet Take 1 tablet (10 mg) by mouth. 9/6/17   Historical Provider, MD   DULoxetine (Cymbalta) 60 mg DR capsule Take 1 capsule (60 mg) by mouth once daily. 5/1/23   Historical Provider, MD   Eliquis 5 mg tablet Take 1 tablet (5 mg) by mouth 2 times a day. 5/3/23   Historical Provider, MD   ergocalciferol (Vitamin D-2) 1.25 MG (18835 UT) capsule Take 1 capsule (1,250 mcg) by mouth 1 (one) time per week. 3/19/23   Historical Provider, MD   famotidine (Pepcid) 20 mg tablet Take 1 tablet  (20 mg) by mouth twice a day. 4/22/24   Historical Provider, MD   losartan (Cozaar) 25 mg tablet Take 1 tablet (25 mg) by mouth once daily in the morning. 7/16/24   Historical Provider, MD   metoprolol tartrate (Lopressor) 25 mg tablet Take 1 tablet (25 mg) by mouth 2 times a day. 12/4/24   Historical Provider, MD   montelukast (Singulair) 10 mg tablet Take 1 tablet (10 mg) by mouth once daily.    Historical Provider, MD   ondansetron ODT (Zofran-ODT) 4 mg disintegrating tablet Dissolve 1 tablet (4 mg) in the mouth every 8 hours if needed.    Historical Provider, MD   Ozempic 0.25 mg or 0.5 mg (2 mg/3 mL) pen injector 0.25 mg 1 (one) time per week. 9/20/24   Historical Provider, MD   traMADol (Ultram) 50 mg tablet Take 1 tablet (50 mg) by mouth 2 times a day. 5/4/23   Historical Provider, MD         Relevant Results Recent labs reviewed in the EMR.    Lab Results   Component Value Date/Time    WBC 9.2 04/09/2025 1416    HGB 13.2 04/09/2025 1416    HGB 10.6 (L) 12/22/2024 0449    HGB 11.9 (L) 12/21/2024 1653    HGB 12.8 07/15/2020 0544    HGB 12.0 03/11/2020 0621    HGB 11.9 (L) 11/27/2019 0828    MCV 85.9 04/09/2025 1416     04/09/2025 1416     12/22/2024 0449     12/21/2024 1653     07/15/2020 0544     03/11/2020 0621     11/27/2019 0828    [id number] 277 04/09/2025 1416       Lab Results   Component Value Date/Time     04/09/2025 1416    K 4.3 04/09/2025 1416     04/09/2025 1416    BUN 18 04/09/2025 1416    CREATININE 0.68 04/09/2025 1416    CREATININE 0.69 12/25/2024 0548    CREATININE 0.72 12/24/2024 0540       Lab Results   Component Value Date/Time    BILITOT 1.1 04/09/2025 1416    BILITOT 1.6 (H) 12/22/2024 0449    BILITOT 2.3 (H) 12/21/2024 1653    BILITOT 0.8 07/15/2020 0544    BILITOT 1.0 03/11/2020 0621    BILITOT 0.6 11/27/2019 0828    ALKPHOS 83 04/09/2025 1416    ALKPHOS 58 12/22/2024 0449    ALKPHOS 66 12/21/2024 1653    ALKPHOS 86 07/15/2020 0544  "   ALKPHOS 94 03/11/2020 0621    ALKPHOS 95 11/27/2019 0828    AST 13 04/09/2025 1416    AST 42 (H) 12/22/2024 0449    AST 35 12/21/2024 1653    AST 15 07/15/2020 0544    AST 12 03/11/2020 0621    AST 11 11/27/2019 0828    ALT 12 04/09/2025 1416    ALT 29 12/22/2024 0449    ALT 26 12/21/2024 1653    ALT 17 07/15/2020 0544    ALT 14 03/11/2020 0621    ALT 13 11/27/2019 0828    LIPASE 3 (L) 12/21/2024 1653       No results found for: \"CRP\", \"CALPS\"    Radiology: Imaging reviewed in the EMR.    === 12/21/24 ===    CT CERVICAL SPINE WO IV CONTRAST    - Impression -  CT HEAD:  1. No evidence of hemorrhage, skull fracture, or other acute  intracranial abnormality.  2. Subtle attenuation changes in the periventricular white matter are  nonspecific, but favored to represent sequela of microvascular  disease.    CT C-SPINE:  1. Postsurgical changes of ACDF of C5-C6, without evidence of acute  trauma to the cervical spine.  2. Spondylotic changes of the cervical spine as detailed above.    MACRO:  None    Signed by: Tray William 12/21/2024 5:30 PM  Dictation workstation:   [id number]           [1]   Patient Active Problem List  Diagnosis    Personal history of other venous thrombosis and embolism    Peripheral neuropathy    Low back pain    Knee instability, right    Inappropriate sinus node tachycardia (CMS-HCC)    Hyperlipidemia    Essential hypertension    Diastolic dysfunction without heart failure    Dermatographism    Depression with anxiety    Asthma    Morbid obesity (Multi)    Primary osteoarthritis involving multiple joints    Sensorineural hearing loss, bilateral    Chronic pain syndrome    Controlled type 2 diabetes mellitus without complication, without long-term current use of insulin    Shortness of breath    Osteoarthritis    Arthralgia of hip   [2]   Family History  Problem Relation Name Age of Onset    Colon cancer Paternal Grandfather       "

## 2025-06-11 ENCOUNTER — TELEPHONE (OUTPATIENT)
Dept: PREADMISSION TESTING | Facility: HOSPITAL | Age: 75
End: 2025-06-11

## 2025-06-19 ENCOUNTER — HOSPITAL ENCOUNTER (OUTPATIENT)
Dept: CARDIOLOGY | Facility: HOSPITAL | Age: 75
Discharge: HOME | End: 2025-06-19
Payer: MEDICARE

## 2025-06-19 ENCOUNTER — PRE-ADMISSION TESTING (OUTPATIENT)
Dept: PREADMISSION TESTING | Facility: HOSPITAL | Age: 75
End: 2025-06-19
Payer: MEDICARE

## 2025-06-19 VITALS
HEART RATE: 93 BPM | DIASTOLIC BLOOD PRESSURE: 68 MMHG | RESPIRATION RATE: 17 BRPM | SYSTOLIC BLOOD PRESSURE: 122 MMHG | HEIGHT: 66 IN | OXYGEN SATURATION: 95 % | BODY MASS INDEX: 41.46 KG/M2 | TEMPERATURE: 98.6 F | WEIGHT: 258 LBS

## 2025-06-19 DIAGNOSIS — Z12.11 SCREENING FOR MALIGNANT NEOPLASM OF COLON: ICD-10-CM

## 2025-06-19 DIAGNOSIS — E11.9 CONTROLLED TYPE 2 DIABETES MELLITUS WITHOUT COMPLICATION, WITHOUT LONG-TERM CURRENT USE OF INSULIN: ICD-10-CM

## 2025-06-19 DIAGNOSIS — I10 ESSENTIAL HYPERTENSION: ICD-10-CM

## 2025-06-19 DIAGNOSIS — Z01.818 PRE-OP EVALUATION: ICD-10-CM

## 2025-06-19 DIAGNOSIS — Z01.818 PRE-OP EVALUATION: Primary | ICD-10-CM

## 2025-06-19 LAB
ANION GAP SERPL CALC-SCNC: 13 MMOL/L (ref 10–20)
ATRIAL RATE: 85 BPM
BUN SERPL-MCNC: 21 MG/DL (ref 6–23)
CALCIUM SERPL-MCNC: 8.6 MG/DL (ref 8.6–10.3)
CHLORIDE SERPL-SCNC: 108 MMOL/L (ref 98–107)
CO2 SERPL-SCNC: 26 MMOL/L (ref 21–32)
CREAT SERPL-MCNC: 0.72 MG/DL (ref 0.5–1.05)
EGFRCR SERPLBLD CKD-EPI 2021: 88 ML/MIN/1.73M*2
ERYTHROCYTE [DISTWIDTH] IN BLOOD BY AUTOMATED COUNT: 13.8 % (ref 11.5–14.5)
GLUCOSE SERPL-MCNC: 130 MG/DL (ref 74–99)
HCT VFR BLD AUTO: 39.1 % (ref 36–46)
HGB BLD-MCNC: 12.8 G/DL (ref 12–16)
MCH RBC QN AUTO: 27.9 PG (ref 26–34)
MCHC RBC AUTO-ENTMCNC: 32.7 G/DL (ref 32–36)
MCV RBC AUTO: 85 FL (ref 80–100)
NRBC BLD-RTO: 0 /100 WBCS (ref 0–0)
P AXIS: 44 DEGREES
PLATELET # BLD AUTO: 315 X10*3/UL (ref 150–450)
POTASSIUM SERPL-SCNC: 4.3 MMOL/L (ref 3.5–5.3)
PR INTERVAL: 160 MS
Q ONSET: 249 MS
QRS COUNT: 14 BEATS
QRS DURATION: 93 MS
QT INTERVAL: 357 MS
QTC CALCULATION(BAZETT): 422 MS
QTC FREDERICIA: 399 MS
R AXIS: 27 DEGREES
RBC # BLD AUTO: 4.59 X10*6/UL (ref 4–5.2)
SODIUM SERPL-SCNC: 143 MMOL/L (ref 136–145)
T AXIS: 12 DEGREES
T OFFSET: 428 MS
VENTRICULAR RATE: 84 BPM
WBC # BLD AUTO: 8.8 X10*3/UL (ref 4.4–11.3)

## 2025-06-19 PROCEDURE — 99203 OFFICE O/P NEW LOW 30 MIN: CPT | Performed by: CLINICAL NURSE SPECIALIST

## 2025-06-19 PROCEDURE — 93005 ELECTROCARDIOGRAM TRACING: CPT

## 2025-06-19 PROCEDURE — 93010 ELECTROCARDIOGRAM REPORT: CPT | Performed by: INTERNAL MEDICINE

## 2025-06-19 PROCEDURE — 36415 COLL VENOUS BLD VENIPUNCTURE: CPT

## 2025-06-19 PROCEDURE — 85027 COMPLETE CBC AUTOMATED: CPT

## 2025-06-19 PROCEDURE — 80048 BASIC METABOLIC PNL TOTAL CA: CPT

## 2025-06-19 RX ORDER — METOPROLOL SUCCINATE 25 MG/1
25 TABLET, EXTENDED RELEASE ORAL DAILY
COMMUNITY

## 2025-06-19 ASSESSMENT — ENCOUNTER SYMPTOMS
PSYCHIATRIC NEGATIVE: 1
EYES NEGATIVE: 1
ALLERGIC/IMMUNOLOGIC NEGATIVE: 1
ARTHRALGIAS: 1
CONSTITUTIONAL NEGATIVE: 1
GASTROINTESTINAL NEGATIVE: 1
CARDIOVASCULAR NEGATIVE: 1
ENDOCRINE NEGATIVE: 1
HEMATOLOGIC/LYMPHATIC NEGATIVE: 1
RESPIRATORY NEGATIVE: 1

## 2025-06-19 ASSESSMENT — DUKE ACTIVITY SCORE INDEX (DASI)
CAN YOU PARTICIPATE IN MODERATE RECREATIONAL ACTIVITIES LIKE GOLF, BOWLING, DANCING, DOUBLES TENNIS OR THROWING A BASEBALL OR FOOTBALL: NO
CAN YOU CLIMB A FLIGHT OF STAIRS OR WALK UP A HILL: NO
CAN YOU DO MODERATE WORK AROUND THE HOUSE LIKE VACUUMING, SWEEPING FLOORS OR CARRYING GROCERIES: NO
DASI METS SCORE: 3.3
CAN YOU WALK INDOORS, SUCH AS AROUND YOUR HOUSE: YES
CAN YOU DO LIGHT WORK AROUND THE HOUSE LIKE DUSTING OR WASHING DISHES: NO
CAN YOU RUN A SHORT DISTANCE: NO
CAN YOU DO YARD WORK LIKE RAKING LEAVES, WEEDING OR PUSHING A MOWER: NO
CAN YOU HAVE SEXUAL RELATIONS: NO
CAN YOU WALK A BLOCK OR TWO ON LEVEL GROUND: NO
CAN YOU PARTICIPATE IN STRENOUS SPORTS LIKE SWIMMING, SINGLES TENNIS, FOOTBALL, BASKETBALL, OR SKIING: NO
TOTAL_SCORE: 4.5
CAN YOU DO HEAVY WORK AROUND THE HOUSE LIKE SCRUBBING FLOORS OR LIFTING AND MOVING HEAVY FURNITURE: NO
CAN YOU TAKE CARE OF YOURSELF (EAT, DRESS, BATHE, OR USE TOILET): YES

## 2025-06-19 ASSESSMENT — LIFESTYLE VARIABLES: SMOKING_STATUS: NONSMOKER

## 2025-06-19 NOTE — PREPROCEDURE INSTRUCTIONS
Medication List            Accurate as of June 19, 2025 10:47 AM. Always use your most recent med list.                * albuterol 2.5 mg /3 mL (0.083 %) nebulizer solution     * albuterol 90 mcg/actuation inhaler     baclofen 10 mg tablet  Commonly known as: Lioresal     benzonatate 100 mg capsule  Commonly known as: Tessalon     cetirizine 10 mg tablet  Commonly known as: ZyrTEC     DULoxetine 60 mg DR capsule  Commonly known as: Cymbalta     Eliquis 5 mg tablet  Generic drug: apixaban     ergocalciferol 1250 mcg (50,000 units) capsule  Commonly known as: Vitamin D-2     losartan 25 mg tablet  Commonly known as: Cozaar     metoprolol succinate XL 25 mg 24 hr tablet  Commonly known as: Toprol-XL     metoprolol tartrate 25 mg tablet  Commonly known as: Lopressor     montelukast 10 mg tablet  Commonly known as: Singulair     ondansetron ODT 4 mg disintegrating tablet  Commonly known as: Zofran-ODT     Ozempic 0.25 mg or 0.5 mg (2 mg/3 mL) pen injector  Generic drug: semaglutide     Symbicort 160-4.5 mcg/actuation inhaler  Generic drug: budesonide-formoterol     traMADol 50 mg tablet  Commonly known as: Ultram           * This list has 2 medication(s) that are the same as other medications prescribed for you. Read the directions carefully, and ask your doctor or other care provider to review them with you.                Last dose of Ozempic 6-15-25  Last dose of Eliquis 6-24-25     Take Metoprolol the morning of your procedure sip of water.   Follow bowel prep instructions and stay on a clear liquid diet the day before your procedure.      NPO Instructions:     Do not drink any liquid after midnight the night before your surgery  Do not eat any food after midnight the night before your surgery/procedure.  Candy, gum, mints and smoking of cigarettes, marijuana or vaping is not permitted after midnight prior to your surgery   Do not drink Alcohol 24 hours prior to surgery      Increase fluid intake day before  surgery    Additional Instructions:      Review your medication instructions, take indicated medications    Wear  comfortable loose fitting clothing  Do not use moisturizers, creams, lotions or perfume  All jewelry and valuables should be left at home. May bring glasses and partials.    Stop blood thinning medications as instructed by ordering physician or surgeon    Shower or bathe the night before or day of surgery.   Brush teeth and avoid perfumes, colognes, powders, makeup, aftershave and hair spray    Go to Registration, in the main lobby, upon arrival on the day of surgery and have 's license and medical insurance card available.    Call [phone] the day before your surgery/procedure to find out what time you are to arrive the next day.     Please have a responsible adult to drive you home and be available to help you as needed after surgery.   Cannot use public transportation or an insurance service for your ride home.

## 2025-06-19 NOTE — H&P
History Of Present Illness  Mary Dao is a very pleasant 75 y.o. female [facility] daughter [facility]. Patient is scheduled for screening colonoscopy under MAC anesthesia per Dr. Noguera on 6/27/25. No complaints of bowel or GI issues.          Past Medical History  Past Medical History:   Diagnosis Date    Age-related nuclear cataract of right eye 08/05/2019    Other specified anxiety disorders 07/13/2018    Depression with anxiety    Personal history of other diseases of the circulatory system 11/10/2017    History of essential hypertension    Personal history of other diseases of the respiratory system 11/10/2017    History of asthma    Personal history of other specified conditions     History of palpitations    Unspecified asthma, uncomplicated ([facility]) 12/11/2019    Asthma    Urinary incontinence 05/12/2023       Surgical History  Past Surgical History:   Procedure Laterality Date    ANTERIOR CRUCIATE LIGAMENT REPAIR      CHOLECYSTECTOMY N/A     SPINE SURGERY      2024    TONSILLECTOMY          Social History  She reports that she has never smoked. She has never used smokeless tobacco. She reports that she does not drink alcohol and does not use drugs.    Family History  Family History   Problem Relation Name Age of Onset    Diabetes Mother      Heart disease Mother      Aneurysm Father      Diabetes Maternal Grandmother      Stroke Maternal Grandmother      Cancer Paternal Grandmother      Colon cancer Paternal Grandfather      Diabetes Mother's Sister          Allergies  Coffee (coffea arabica), Codeine, Dextroamphetamine, Dextromethorphan, Fluticasone propion-salmeterol, Lidocaine, Morphine, Other, Penicillins, and Sulfa (sulfonamide antibiotics)    Review of Systems   Constitutional: Negative.    HENT: Negative.     Eyes: Negative.    Respiratory: Negative.     Cardiovascular: Negative.    Gastrointestinal: Negative.    Endocrine: Negative.    Genitourinary: Negative.   "  Musculoskeletal:  Positive for arthralgias and gait problem.        Recent fall last night, patient tripped while going to answer the door.    Skin: Negative.    Allergic/Immunologic: Negative.    Hematological: Negative.    Psychiatric/Behavioral: Negative.     All other systems reviewed and are negative.       Physical Exam  Vitals and nursing note reviewed.   Constitutional:       Appearance: Normal appearance. She is obese.   HENT:      Head: Normocephalic.      Ears:      Comments: Hearing aids.      Nose: Nose normal.      Mouth/Throat:      Comments:   Mallampati: 1  TMD: >3  Finger breadth: 3  Dentition:  a few missing teeth, bottom.  Neck ROM: limited.   Eyes:      Pupils: Pupils are equal, round, and reactive to light.   Cardiovascular:      Rate and Rhythm: Normal rate and regular rhythm.      Heart sounds: Normal heart sounds, S1 normal and S2 normal.   Pulmonary:      Effort: Pulmonary effort is normal.      Breath sounds: Normal breath sounds.      Comments: Lungs clear throughout all fields.   Abdominal:      General: Bowel sounds are normal.      Palpations: Abdomen is soft.      Comments: Bowel sounds active x4 quads    Musculoskeletal:         General: Normal range of motion.      Cervical back: Normal range of motion.      Right lower leg: No edema.      Left lower leg: No edema.      Comments: Using wheeled/rolator walker.    Skin:     General: Skin is warm and dry.   Neurological:      General: No focal deficit present.      Mental Status: She is alert and oriented to person, place, and time.   Psychiatric:         Mood and Affect: Mood normal.         Behavior: Behavior normal.         Thought Content: Thought content normal.         Judgment: Judgment normal.          Last Recorded Vitals  Blood pressure 122/68, pulse 93, temperature 37 °C (98.6 °F), temperature source Temporal, resp. rate 17, height 1.676 m (5' 6\"), weight 117 kg (258 lb), SpO2 95%.      DASI Risk Score      Flowsheet Row " Pre-Admission Testing from 6/19/2025 in  [facility]   Can you take care of yourself (eat, dress, bathe, or use toilet)?  2.75 filed at 06/19/2025 1045   Can you walk indoors, such as around your house? 1.75  [uses rolator] filed at 06/19/2025 1045   Can you walk a block or two on level ground?  0 filed at 06/19/2025 1045   Can you climb a flight of stairs or walk up a hill? 0 filed at 06/19/2025 1045   Can you run a short distance? 0 filed at 06/19/2025 1045   Can you do light work around the house like dusting or washing dishes? 0 filed at 06/19/2025 1045   Can you do moderate work around the house like vacuuming, sweeping floors or carrying groceries? 0 filed at 06/19/2025 1045   Can you do heavy work around the house like scrubbing floors or lifting and moving heavy furniture?  0 filed at 06/19/2025 1045   Can you do yard work like raking leaves, weeding or pushing a mower? 0 filed at 06/19/2025 1045   Can you have sexual relations? 0 filed at 06/19/2025 1045   Can you participate in moderate recreational activities like golf, bowling, dancing, doubles tennis or throwing a baseball or football? 0 filed at 06/19/2025 1045   Can you participate in strenous sports like swimming, singles tennis, football, basketball, or skiing? 0 filed at 06/19/2025 1045   DASI SCORE 4.5 filed at 06/19/2025 1045   METS Score (Will be calculated only when all the questions are answered) 3.3 filed at 06/19/2025 1045          Caprini DVT Assessment      Flowsheet Row Pre-Admission Testing from 6/19/2025 in  [facility] ED to Hosp-Admission (Discharged) from 12/21/2024 in [facility] with Jazmyn Ho MD and Gregory Garcia MD   DVT Score (IF A SCORE IS NOT CALCULATING, MUST SELECT A BMI TO COMPLETE) 9 filed at 06/19/2025 0910 5 filed at 12/21/2024 2157   Medical Factors History of DVT/PE filed at 06/19/2025 0910 --   Surgical Factors Minor surgery planned filed at 06/19/2025 0910 --    BMI (BMI MUST BE CHOSEN) 41-50 (Morbid obesity) filed at 06/19/2025 0910 41-50 (Morbid obesity) filed at 12/21/2024 8197          Modified Frailty Index    No data to display       [id number] Stroke Risk Points  Current as of just now        N/A 0 to 9 Points:      Last Change: N/A          The [id number] risk score (Lip LUDIN, et al. 2009. © 2010 [employer]) quantifies the risk of stroke for a patient with atrial fibrillation. For patients without atrial fibrillation or under the age of 18 this score appears as N/A. Higher score values generally indicate higher risk of stroke.        This score is not applicable to this patient. Components are not calculated.          Revised Cardiac Risk Index      Flowsheet Row Pre-Admission Testing from 6/19/2025 in  [facility]   High-Risk Surgery (Intraperitoneal, Intrathoracic,Suprainguinal vascular) 0 filed at 06/19/2025 0910   History of ischemic heart disease (History of MI, History of positive exercuse test, Current chest paint considered due to myocardial ischemia, Use of nitrate therapy, ECG with pathological Q Waves) 1 filed at 06/19/2025 0910   History of congestive heart failure (pulmonary edemia, bilateral rales or S3 gallop, Paroxysmal nocturnal dyspnea, CXR showing pulmonary vascular redistribution) 0 filed at 06/19/2025 0910   History of cerebrovascular disease (Prior TIA or stroke) 0 filed at 06/19/2025 0910   Pre-operative insulin treatment 0 filed at 06/19/2025 0910   Pre-operative creatinine>2 mg/dl 0 filed at 06/19/2025 0910   Revised Cardiac Risk Calculator 1 filed at 06/19/2025 0910          Apfel Simplified Score      Flowsheet Row Pre-Admission Testing from 6/19/2025 in  [facility]   Smoking status 1 filed at 06/19/2025 0910   History of motion sickness or PONV  0 filed at 06/19/2025 0910   Use of postoperative opioids 0 filed at 06/19/2025 0910   Gender - Female 1=Yes filed at 06/19/2025 0910    Apfel Simplified Score Calculator 2 filed at 06/19/2025 0910          Risk Analysis Index Results This Encounter    No data found in the last 10 encounters.       Stop Bang Score      Flowsheet Row Pre-Admission Testing from 6/19/2025 in  [facility]   Do you snore loudly? 0 filed at 06/19/2025 1042   Do you often feel tired or fatigued after your sleep? 1 filed at 06/19/2025 1042   Has anyone ever observed you stop breathing in your sleep? 0 filed at 06/19/2025 1042   Do you have or are you being treated for high blood pressure? 1 filed at 06/19/2025 1042   Recent BMI (Calculated) 41.7 filed at 06/19/2025 1042   Is BMI greater than 35 kg/m2? 1=Yes filed at 06/19/2025 1042   Age older than 50 years old? 1=Yes filed at 06/19/2025 1042   Is your neck circumference greater than 17 inches (Male) or 16 inches (Female)? 1 filed at 06/19/2025 1042   Gender - Male 0=No filed at 06/19/2025 1042   STOP-BANG Total Score 5 filed at 06/19/2025 1042          Prodigy: High Risk  Total Score: 15              Prodigy Age Score      Prodigy Previous Opioid Use Score           ARISCAT Score for Postoperative Pulmonary Complications      Flowsheet Row Pre-Admission Testing from 6/19/2025 in  [facility]   Age Calculated Score 3 filed at 06/19/2025 0910   Preoperative SpO2 0 filed at 06/19/2025 0910   Respiratory infection in the last month Either upper or lower (i.e., URI, bronchitis, pneumonia), with fever and antibiotic treatment 0 filed at 06/19/2025 0910   Preoperative anemia (Hgb less than 10 g/dl) 0 filed at 06/19/2025 0910   Surgical incision  0 filed at 06/19/2025 0910   Duration of surgery  0 filed at 06/19/2025 0910   Emergency Procedure  0 filed at 06/19/2025 0910   ARISCAT Total Score  3 filed at 06/19/2025 0910          [place] Perioperative Risk for Myocardial Infarction or Cardiac Arrest (NAKUL)      Flowsheet Row Pre-Admission Testing from 6/19/2025 in  [facility]   Calculated  Age Score 1.48 filed at 06/19/2025 0911   Functional Status  0 filed at 06/19/2025 0911   ASA Class  -1.92 filed at 06/19/2025 0911   Creatinine -0.10 filed at 06/19/2025 0911   Type of Procedure  1.14 filed at 06/19/2025 0911   NAKUL Total Score  -4.65 filed at 06/19/2025 0911   NAKUL % 0.95 filed at 06/19/2025 0911          Relevant Results  Results for orders placed or performed in visit on 06/19/25 (from the past 24 hours)   Basic Metabolic Panel   Result Value Ref Range    Glucose 130 (H) 74 - 99 mg/dL    Sodium 143 136 - 145 mmol/L    Potassium 4.3 3.5 - 5.3 mmol/L    Chloride 108 (H) 98 - 107 mmol/L    Bicarbonate 26 21 - 32 mmol/L    Anion Gap 13 10 - 20 mmol/L    Urea Nitrogen 21 6 - 23 mg/dL    Creatinine 0.72 0.50 - 1.05 mg/dL    eGFR 88 >60 mL/min/1.73m*2    Calcium 8.6 8.6 - 10.3 mg/dL   CBC   Result Value Ref Range    WBC 8.8 4.4 - 11.3 x10*3/uL    nRBC 0.0 0.0 - 0.0 /100 WBCs    RBC 4.59 4.00 - 5.20 x10*6/uL    Hemoglobin 12.8 12.0 - 16.0 g/dL    Hematocrit 39.1 36.0 - 46.0 %    MCV 85 80 - 100 fL    MCH 27.9 26.0 - 34.0 pg    MCHC 32.7 32.0 - 36.0 g/dL    RDW 13.8 11.5 - 14.5 %    Platelets 315 150 - 450 x10*3/uL             Problem List Items Addressed This Visit       Essential hypertension    Relevant Orders    Basic Metabolic Panel (Completed)    CBC (Completed)    ECG 12 Lead (Completed)    Controlled type 2 diabetes mellitus without complication, without long-term current use of insulin    Relevant Orders    Basic Metabolic Panel (Completed)    CBC (Completed)    ECG 12 Lead (Completed)     Other Visit Diagnoses         Pre-op evaluation    -  Primary    Relevant Orders    Basic Metabolic Panel (Completed)    CBC (Completed)    ECG 12 Lead (Completed)      Screening for malignant neoplasm of colon        Relevant Orders    Basic Metabolic Panel (Completed)    CBC (Completed)    ECG 12 Lead (Completed)          Scheduled for screening colonoscopy under MAC anesthesia per Dr. Noguera on 6/27/25.  CBC,  BMP ordered. Reviewed and these are WNL and acceptable for upcoming surgery.   Hx of HTN, DM. EKG ordered. Shows SR, rate of 84 bpm.   H&P and airway assessment completed.   Last dose of Ozempic 6/15/25.   Last dose of Eliquis 6/24/25.   FALL RISK  All surgery instructions reviewed with patient by RN. Verbalized understanding.         Zoya Valenzuela, PRATIK-CNS

## 2025-06-23 ENCOUNTER — ANESTHESIA EVENT (OUTPATIENT)
Dept: OPERATING ROOM | Facility: HOSPITAL | Age: 75
End: 2025-06-23
Payer: MEDICARE

## 2025-06-24 ENCOUNTER — HOSPITAL ENCOUNTER (OUTPATIENT)
Dept: RADIOLOGY | Facility: CLINIC | Age: 75
Discharge: HOME | End: 2025-06-24
Payer: MEDICARE

## 2025-06-24 ENCOUNTER — APPOINTMENT (OUTPATIENT)
Dept: ORTHOPEDIC SURGERY | Facility: CLINIC | Age: 75
End: 2025-06-24
Payer: MEDICARE

## 2025-06-24 VITALS — HEIGHT: 66 IN | WEIGHT: 258 LBS | BODY MASS INDEX: 41.46 KG/M2

## 2025-06-24 DIAGNOSIS — M16.12 PRIMARY OSTEOARTHRITIS OF LEFT HIP: Primary | ICD-10-CM

## 2025-06-24 DIAGNOSIS — M25.552 PAIN OF LEFT HIP JOINT: ICD-10-CM

## 2025-06-24 PROCEDURE — 73502 X-RAY EXAM HIP UNI 2-3 VIEWS: CPT | Mod: LEFT SIDE | Performed by: STUDENT IN AN ORGANIZED HEALTH CARE EDUCATION/TRAINING PROGRAM

## 2025-06-24 PROCEDURE — 73502 X-RAY EXAM HIP UNI 2-3 VIEWS: CPT | Mod: LT

## 2025-06-24 PROCEDURE — 3008F BODY MASS INDEX DOCD: CPT | Performed by: ORTHOPAEDIC SURGERY

## 2025-06-24 PROCEDURE — 4010F ACE/ARB THERAPY RXD/TAKEN: CPT | Performed by: ORTHOPAEDIC SURGERY

## 2025-06-24 PROCEDURE — 99204 OFFICE O/P NEW MOD 45 MIN: CPT | Performed by: ORTHOPAEDIC SURGERY

## 2025-06-24 PROCEDURE — 1125F AMNT PAIN NOTED PAIN PRSNT: CPT | Performed by: ORTHOPAEDIC SURGERY

## 2025-06-24 PROCEDURE — 1159F MED LIST DOCD IN RCRD: CPT | Performed by: ORTHOPAEDIC SURGERY

## 2025-06-24 ASSESSMENT — PAIN - FUNCTIONAL ASSESSMENT: PAIN_FUNCTIONAL_ASSESSMENT: 0-10

## 2025-06-24 ASSESSMENT — PAIN SCALES - GENERAL: PAINLEVEL_OUTOF10: 9

## 2025-06-24 ASSESSMENT — PAIN DESCRIPTION - DESCRIPTORS: DESCRIPTORS: SHOOTING;STABBING;SHARP

## 2025-06-24 NOTE — LETTER
June 24, 2025     DELVIS Stone  [address] [facility] [address]    Patient: Mary Dao   YOB: 1950   Date of Visit: 6/24/2025       Dear PRATIK Roblero-MULU:    Thank you for referring Mary Dao to me for evaluation. Below are my notes for this consultation.  If you have questions, please do not hesitate to call me. I look forward to following your patient along with you.       Sincerely,     Andreea Mitchell,       CC: No Recipients  ______________________________________________________________________________________    PRIMARY CARE PHYSICIAN: DELVIS Stone  REFERRING PROVIDER: No referring provider defined for this encounter.     CONSULT ORTHOPAEDIC: Hip Evaluation        ASSESSMENT & PLAN    IMPRESSION:  Primary osteoarthritis, left hip     PLAN:  discussed with patient her diagnosis above and reviewed her x-rays with her and her daughter.  She does have severe arthritis and the most definitive treatment at some point will be to proceed with a left hip replacement.  She has a known history of left hip osteoarthritis that has been managed conservatively as she has not been deemed a surgical candidate in the past by other surgeons.  Her symptoms have been severely exacerbated due to this fall but I do not see any sort of fracture or pelvic injury at this time and likely her underlying arthritic symptoms of just gotten worse.  At this point she is currently working on weight loss and has been on Ozempic and overall doing well in her weight loss.  We did offer the option of corticosteroid injection for pain control given that her BMI is over 40 which is her cutoff for surgery.  She would like to decline at this time and continue work on weight loss and follow-up in 2 months for weight check to see if she is a surgical candidate at that point.  In the meantime we will continue their standard pain management plan as described below and follow-up as  needed.      SUBJECTIVE  CHIEF COMPLAINT:   Chief Complaint   Patient presents with   • Left Hip - Pain        HPI: Mary Dao is a 75 y.o. patient. Mary Dao has had progressive problems with the left hip over the past 5 year.  Overall has had a stable history of left hip osteoarthritis but has been severely exacerbated in the past week due to a fall.  They do reports history of trauma to the area(s), which include a fall that occurred 06/18/2025. Pt was climbing a step and fell backwards on her hip. They deny having any numbness and tingling in their hip. Their symptoms that are interfering with their daily life include all normal daily activities. Worse with walking. XR done 06/24/2025.    FUNCTIONAL STATUS: limited:  unable to perform activities of daily living.  AMBULATORY STATUS: Walker  PREVIOUS TREATMENTS: cannot take NSAIDs due to being on Eliquis (previous DVT four years ago), tylenol, tramadol (prescribed by PCP, takes one a day)  HISTORY OF SURGERY ON AFFECTED HIP(S): No   BACK PAIN REPORTED: Yes, previous spine surgery last year  PREVIOUS NOTES REVIEWED: none to review      REVIEW OF SYSTEMS  Constitutional: See HPI for pain assessment, No significant weight loss, recent trauma  Cardiovascular: No chest pain, shortness of breath  Respiratory: No difficulty breathing, cough  Gastrointestinal: No nausea, vomiting, diarrhea, constipation  Musculoskeletal: Noted in HPI, positive for pain, restricted motion, stiffness  Integumentary: No rashes, easy bruising, redness   Neurological: no numbness or tingling in extremities, no gait disturbances   Psychiatric: No mood changes, memory changes, social issues  Heme/Lymph: no excessive swelling, easy bruising, excessive bleeding  ENT: No hearing changes  Eyes: No vision changes    Medical History[1]     Allergies[2]     Surgical History[3]     Family History[4]     Social History     Socioeconomic History   • Marital status:      Spouse name: Not  on file   • Number of children: Not on file   • Years of education: Not on file   • Highest education level: Not on file   Occupational History   • Not on file   Tobacco Use   • Smoking status: Never   • Smokeless tobacco: Never   Vaping Use   • Vaping status: Never Used   Substance and Sexual Activity   • Alcohol use: Never   • Drug use: Never   • Sexual activity: Not on file   Other Topics Concern   • Not on file   Social History Narrative   • Not on file     Social Drivers of Health     Financial Resource Strain: Low Risk  (12/22/2024)    Overall Financial Resource Strain (CARDIA)    • Difficulty of Paying Living Expenses: Not hard at all   Food Insecurity: No Food Insecurity (12/22/2024)    Hunger Vital Sign    • Worried About Running Out of Food in the Last Year: Never true    • Ran Out of Food in the Last Year: Never true   Transportation Needs: No Transportation Needs (12/23/2024)    PRAPARE - Transportation    • Lack of Transportation (Medical): No    • Lack of Transportation (Non-Medical): No   Physical Activity: Inactive (12/22/2024)    Exercise Vital Sign    • Days of Exercise per Week: 0 days    • Minutes of Exercise per Session: 0 min   Stress: No Stress Concern Present (12/22/2024)    [employer] [place] [employer] - Occupational Stress Questionnaire    • Feeling of Stress : Not at all   Social Connections: Moderately Isolated (12/22/2024)    Social Connection and Isolation Panel [NHANES]    • Frequency of Communication with Friends and Family: More than three times a week    • Frequency of Social Gatherings with Friends and Family: More than three times a week    • Attends Christian Services: 1 to 4 times per year    • Active Member of Clubs or Organizations: No    • Attends Club or Organization Meetings: Never    • Marital Status:    Intimate Partner Violence: Not At Risk (12/22/2024)    Humiliation, Afraid, Rape, and Kick questionnaire    • Fear of Current or Ex-Partner: No    •  "Emotionally Abused: No    • Physically Abused: No    • Sexually Abused: No   Housing Stability: Low Risk  (12/23/2024)    Housing Stability Vital Sign    • Unable to Pay for Housing in the Last Year: No    • Number of Times Moved in the Last Year: 0    • Homeless in the Last Year: No        CURRENT MEDICATIONS:   Current Medications[5]     OBJECTIVE    PHYSICAL EXAM      12/26/2024    12:52 AM 12/26/2024     6:09 AM 12/26/2024     9:31 AM 4/10/2025     3:06 PM 4/16/2025     9:15 AM 6/19/2025    11:32 AM 6/24/2025     8:20 AM   Vitals   Systolic 168  --  123 122    Diastolic 82  --  79 68    BP Location Right arm  Right arm       Heart Rate 98 84 84  109 93    Temp 36.3 °C (97.4 °F) 36.3 °C (97.3 °F) 36.6 °C (97.8 °F)   37 °C (98.6 °F)    Resp 21 14 16   17    Height    1.676 m (5' 6\") 1.676 m (5' 6\") 1.676 m (5' 6\") 1.676 m (5' 6\")   Weight (lb)    278 258 258 258   BMI    44.87 kg/m2 41.64 kg/m2 41.64 kg/m2 41.64 kg/m2   BSA (m2)    2.42 m2 2.33 m2 2.33 m2 2.33 m2   Visit Report     Report  Report      Body mass index is 41.64 kg/m².    GENERAL: A/Ox3, NAD. Appears healthy, well nourished  PSYCHIATRIC: Mood stable, appropriate memory recall  EYES: EOM intact, no scleral icterus  CARDIAC: regular rate  LUNGS: Breathing non-labored  SKIN: no erythema, rashes, or ecchymoses     MUSCULOSKELETAL:  Laterality: left Hip Exam  - ROM, Extension: full,  5 - 10deg flexion contracture  - Strength: Abduction 5/5 against resitance, Flexion 5/5  - Palpation: mild TTP along greater trochanter  - Log roll/IR exam: painful and limited motion. Pain with hip flexion past 90 degrees and internal rotation  - Straight leg raise: negative  - EHL/PF/DF motor intact  - Gait: antalgic to arthritic side, negative Trendelenburg gait   - Special Tests: none performed    NEUROVASCULAR:  - Neurovascular Status: sensation intact to light touch distally  - Capillary refill brisk at extremities, Bilateral dorsalis pedis pulse 2+ " "      IMAGING:  Multiple views of the affected left hip(s) demonstrate: Severe thrice of left hip with likely an external rotation contracture with complete joint space narrowing, subchondral sclerosis, marginal osteophytic change and subchondral cystic change.   X-rays were personally reviewed and interpreted by me.  Radiology reports were reviewed by me as well, if readily available at the time.    ** Voice Recognition software was used to dictate this note. Please be aware that minor errors in the transcription may be present **    Andreea Mitchell DO  Attending Surgeon  Joint Replacement and Adult Reconstructive Surgery  [place], [place]          [1]  Past Medical History:  Diagnosis Date   • Age-related nuclear cataract of right eye 08/05/2019   • Other specified anxiety disorders 07/13/2018    Depression with anxiety   • Personal history of other diseases of the circulatory system 11/10/2017    History of essential hypertension   • Personal history of other diseases of the respiratory system 11/10/2017    History of asthma   • Personal history of other specified conditions     History of palpitations   • Unspecified asthma, uncomplicated ([facility]) 12/11/2019    Asthma   • Urinary incontinence 05/12/2023   [2]  Allergies  Allergen Reactions   • Coffee (Coffea Arabica) Cough   • Codeine Unknown     \"Intercostal muscle contractions.\"   • Dextroamphetamine Unknown   • Dextromethorphan Other     \"intetecostal muscle contractions.\"   • Fluticasone Propion-Salmeterol Unknown   • Lidocaine Unknown   • Morphine Unknown     \"intetecostal muscle contractions.\"   • Other Unknown     cough, sneeze and chest tightness   • Penicillins Unknown and Rash     Increased redness over body, itching.   • Sulfa (Sulfonamide Antibiotics) Rash   [3]  Past Surgical History:  Procedure Laterality Date   • ANTERIOR CRUCIATE LIGAMENT REPAIR     • CHOLECYSTECTOMY N/A    • SPINE SURGERY      2024   • TONSILLECTOMY   "   [4]  Family History  Problem Relation Name Age of Onset   • Diabetes Mother     • Heart disease Mother     • Aneurysm Father     • Diabetes Maternal Grandmother     • Stroke Maternal Grandmother     • Cancer Paternal Grandmother     • Colon cancer Paternal Grandfather     • Diabetes Mother's Sister     [5]  Current Outpatient Medications   Medication Sig Dispense Refill   • albuterol 2.5 mg /3 mL (0.083 %) nebulizer solution Take 3 mL (2.5 mg) by nebulization every 6 hours if needed.     • albuterol 90 mcg/actuation inhaler Inhale 2 puffs every 4 hours if needed.     • baclofen (Lioresal) 10 mg tablet Take 1 tablet (10 mg) by mouth once daily.     • benzonatate (Tessalon) 100 mg capsule Take 1 capsule (100 mg) by mouth 3 times a day as needed for cough. Do not crush or chew.     • budesonide-formoteroL (Symbicort) 160-4.5 mcg/actuation inhaler Inhale 2 puffs 2 times a day. Rinse mouth with water after use to reduce aftertaste and incidence of candidiasis. Do not swallow.     • cetirizine (ZyrTEC) 10 mg tablet Take 1 tablet (10 mg) by mouth.     • DULoxetine (Cymbalta) 60 mg DR capsule Take 1 capsule (60 mg) by mouth once daily.     • Eliquis 5 mg tablet Take 1 tablet (5 mg) by mouth 2 times a day. (Patient taking differently: Take 0.5 tablets (2.5 mg) by mouth 2 times a day.)     • ergocalciferol (Vitamin D-2) 1.25 MG (69079 UT) capsule Take 1 capsule (1.25 mg) by mouth 1 (one) time per week.     • losartan (Cozaar) 25 mg tablet Take 1 tablet (25 mg) by mouth once daily in the morning. (Patient taking differently: Take 2 tablets (50 mg) by mouth 2 times a day.)     • metoprolol succinate XL (Toprol-XL) 25 mg 24 hr tablet Take 1 tablet (25 mg) by mouth once daily. Do not crush or chew.     • metoprolol tartrate (Lopressor) 25 mg tablet Take 1 tablet (25 mg) by mouth 2 times a day.     • montelukast (Singulair) 10 mg tablet Take 1 tablet (10 mg) by mouth once daily.     • ondansetron ODT (Zofran-ODT) 4 mg  disintegrating tablet Dissolve 1 tablet (4 mg) in the mouth every 8 hours if needed.     • Ozempic 0.25 mg or 0.5 mg (2 mg/3 mL) pen injector 0.25 mg 1 (one) time per week. (Patient taking differently: 0.5 mg 1 (one) time per week.)     • traMADol (Ultram) 50 mg tablet Take 1 tablet (50 mg) by mouth 2 times a day.       No current facility-administered medications for this visit.

## 2025-06-24 NOTE — PROGRESS NOTES
PRIMARY CARE PHYSICIAN: Rosa Alexander, APRN-CNP  REFERRING PROVIDER: No referring provider defined for this encounter.     CONSULT ORTHOPAEDIC: Hip Evaluation        ASSESSMENT & PLAN    IMPRESSION:  Primary osteoarthritis, left hip     PLAN:  discussed with patient her diagnosis above and reviewed her x-rays with her and her daughter.  She does have severe arthritis and the most definitive treatment at some point will be to proceed with a left hip replacement.  She has a known history of left hip osteoarthritis that has been managed conservatively as she has not been deemed a surgical candidate in the past by other surgeons.  Her symptoms have been severely exacerbated due to this fall but I do not see any sort of fracture or pelvic injury at this time and likely her underlying arthritic symptoms of just gotten worse.  At this point she is currently working on weight loss and has been on Ozempic and overall doing well in her weight loss.  We did offer the option of corticosteroid injection for pain control given that her BMI is over 40 which is her cutoff for surgery.  She would like to decline at this time and continue work on weight loss and follow-up in 2 months for weight check to see if she is a surgical candidate at that point.  In the meantime we will continue their standard pain management plan as described below and follow-up as needed.      SUBJECTIVE  CHIEF COMPLAINT:   Chief Complaint   Patient presents with    Left Hip - Pain        HPI: Mary Dao is a 75 y.o. patient. Mary Dao has had progressive problems with the left hip over the past 5 year.  Overall has had a stable history of left hip osteoarthritis but has been severely exacerbated in the past week due to a fall.  They do reports history of trauma to the area(s), which include a fall that occurred 06/18/2025. Pt was climbing a step and fell backwards on her hip. They deny having any numbness and tingling in their hip. Their  symptoms that are interfering with their daily life include all normal daily activities. Worse with walking. XR done 06/24/2025.    FUNCTIONAL STATUS: limited:  unable to perform activities of daily living.  AMBULATORY STATUS: Walker  PREVIOUS TREATMENTS: cannot take NSAIDs due to being on Eliquis (previous DVT four years ago), tylenol, tramadol (prescribed by PCP, takes one a day)  HISTORY OF SURGERY ON AFFECTED HIP(S): No   BACK PAIN REPORTED: Yes, previous spine surgery last year  PREVIOUS NOTES REVIEWED: none to review      REVIEW OF SYSTEMS  Constitutional: See HPI for pain assessment, No significant weight loss, recent trauma  Cardiovascular: No chest pain, shortness of breath  Respiratory: No difficulty breathing, cough  Gastrointestinal: No nausea, vomiting, diarrhea, constipation  Musculoskeletal: Noted in HPI, positive for pain, restricted motion, stiffness  Integumentary: No rashes, easy bruising, redness   Neurological: no numbness or tingling in extremities, no gait disturbances   Psychiatric: No mood changes, memory changes, social issues  Heme/Lymph: no excessive swelling, easy bruising, excessive bleeding  ENT: No hearing changes  Eyes: No vision changes    Medical History[1]     Allergies[2]     Surgical History[3]     Family History[4]     Social History     Socioeconomic History    Marital status:      Spouse name: Not on file    Number of children: Not on file    Years of education: Not on file    Highest education level: Not on file   Occupational History    Not on file   Tobacco Use    Smoking status: Never    Smokeless tobacco: Never   Vaping Use    Vaping status: Never Used   Substance and Sexual Activity    Alcohol use: Never    Drug use: Never    Sexual activity: Not on file   Other Topics Concern    Not on file   Social History Narrative    Not on file     Social Drivers of Health     Financial Resource Strain: Low Risk  (12/22/2024)    Overall Financial Resource Strain (CARDIA)      Difficulty of Paying Living Expenses: Not hard at all   Food Insecurity: No Food Insecurity (12/22/2024)    Hunger Vital Sign     Worried About Running Out of Food in the Last Year: Never true     Ran Out of Food in the Last Year: Never true   Transportation Needs: No Transportation Needs (12/23/2024)    PRAPARE - Transportation     Lack of Transportation (Medical): No     Lack of Transportation (Non-Medical): No   Physical Activity: Inactive (12/22/2024)    Exercise Vital Sign     Days of Exercise per Week: 0 days     Minutes of Exercise per Session: 0 min   Stress: No Stress Concern Present (12/22/2024)    [place] [place] [employer] - Occupational Stress Questionnaire     Feeling of Stress : Not at all   Social Connections: Moderately Isolated (12/22/2024)    Social Connection and Isolation Panel [NHANES]     Frequency of Communication with Friends and Family: More than three times a week     Frequency of Social Gatherings with Friends and Family: More than three times a week     Attends Restorationist Services: 1 to 4 times per year     Active Member of Clubs or Organizations: No     Attends Club or Organization Meetings: Never     Marital Status:    Intimate Partner Violence: Not At Risk (12/22/2024)    Humiliation, Afraid, Rape, and Kick questionnaire     Fear of Current or Ex-Partner: No     Emotionally Abused: No     Physically Abused: No     Sexually Abused: No   Housing Stability: Low Risk  (12/23/2024)    Housing Stability Vital Sign     Unable to Pay for Housing in the Last Year: No     Number of Times Moved in the Last Year: 0     Homeless in the Last Year: No        CURRENT MEDICATIONS:   Current Medications[5]     OBJECTIVE    PHYSICAL EXAM      12/26/2024    12:52 AM 12/26/2024     6:09 AM 12/26/2024     9:31 AM 4/10/2025     3:06 PM 4/16/2025     9:15 AM 6/19/2025    11:32 AM 6/24/2025     8:20 AM   Vitals   Systolic 168  --  123 122    Diastolic 82  --  79 68    BP Location Right arm  " Right arm       Heart Rate 98 84 84  109 93    Temp 36.3 °C (97.4 °F) 36.3 °C (97.3 °F) 36.6 °C (97.8 °F)   37 °C (98.6 °F)    Resp 21 14 16   17    Height    1.676 m (5' 6\") 1.676 m (5' 6\") 1.676 m (5' 6\") 1.676 m (5' 6\")   Weight (lb)    278 258 258 258   BMI    44.87 kg/m2 41.64 kg/m2 41.64 kg/m2 41.64 kg/m2   BSA (m2)    2.42 m2 2.33 m2 2.33 m2 2.33 m2   Visit Report     Report  Report      Body mass index is 41.64 kg/m².    GENERAL: A/Ox3, NAD. Appears healthy, well nourished  PSYCHIATRIC: Mood stable, appropriate memory recall  EYES: EOM intact, no scleral icterus  CARDIAC: regular rate  LUNGS: Breathing non-labored  SKIN: no erythema, rashes, or ecchymoses     MUSCULOSKELETAL:  Laterality: left Hip Exam  - ROM, Extension: full,  5 - 10deg flexion contracture  - Strength: Abduction 5/5 against resitance, Flexion 5/5  - Palpation: mild TTP along greater trochanter  - Log roll/IR exam: painful and limited motion. Pain with hip flexion past 90 degrees and internal rotation  - Straight leg raise: negative  - EHL/PF/DF motor intact  - Gait: antalgic to arthritic side, negative Trendelenburg gait   - Special Tests: none performed    NEUROVASCULAR:  - Neurovascular Status: sensation intact to light touch distally  - Capillary refill brisk at extremities, Bilateral dorsalis pedis pulse 2+       IMAGING:  Multiple views of the affected left hip(s) demonstrate: Severe thrice of left hip with likely an external rotation contracture with complete joint space narrowing, subchondral sclerosis, marginal osteophytic change and subchondral cystic change.   X-rays were personally reviewed and interpreted by me.  Radiology reports were reviewed by me as well, if readily available at the time.    ** Voice Recognition software was used to dictate this note. Please be aware that minor errors in the transcription may be present **    Andreea Mitchell,   Attending Surgeon  Joint Replacement and Adult Reconstructive " "Surgery  [place], [place]          [1]   Past Medical History:  Diagnosis Date    Age-related nuclear cataract of right eye 08/05/2019    Other specified anxiety disorders 07/13/2018    Depression with anxiety    Personal history of other diseases of the circulatory system 11/10/2017    History of essential hypertension    Personal history of other diseases of the respiratory system 11/10/2017    History of asthma    Personal history of other specified conditions     History of palpitations    Unspecified asthma, uncomplicated ([facility]) 12/11/2019    Asthma    Urinary incontinence 05/12/2023   [2]   Allergies  Allergen Reactions    Coffee (Coffea Arabica) Cough    Codeine Unknown     \"Intercostal muscle contractions.\"    Dextroamphetamine Unknown    Dextromethorphan Other     \"intetecostal muscle contractions.\"    Fluticasone Propion-Salmeterol Unknown    Lidocaine Unknown    Morphine Unknown     \"intetecostal muscle contractions.\"    Other Unknown     cough, sneeze and chest tightness    Penicillins Unknown and Rash     Increased redness over body, itching.    Sulfa (Sulfonamide Antibiotics) Rash   [3]   Past Surgical History:  Procedure Laterality Date    ANTERIOR CRUCIATE LIGAMENT REPAIR      CHOLECYSTECTOMY N/A     SPINE SURGERY      2024    TONSILLECTOMY     [4]   Family History  Problem Relation Name Age of Onset    Diabetes Mother      Heart disease Mother      Aneurysm Father      Diabetes Maternal Grandmother      Stroke Maternal Grandmother      Cancer Paternal Grandmother      Colon cancer Paternal Grandfather      Diabetes Mother's Sister     [5]   Current Outpatient Medications   Medication Sig Dispense Refill    albuterol 2.5 mg /3 mL (0.083 %) nebulizer solution Take 3 mL (2.5 mg) by nebulization every 6 hours if needed.      albuterol 90 mcg/actuation inhaler Inhale 2 puffs every 4 hours if needed.      baclofen (Lioresal) 10 mg tablet Take 1 tablet (10 mg) by mouth once daily.   "    benzonatate (Tessalon) 100 mg capsule Take 1 capsule (100 mg) by mouth 3 times a day as needed for cough. Do not crush or chew.      budesonide-formoteroL (Symbicort) 160-4.5 mcg/actuation inhaler Inhale 2 puffs 2 times a day. Rinse mouth with water after use to reduce aftertaste and incidence of candidiasis. Do not swallow.      cetirizine (ZyrTEC) 10 mg tablet Take 1 tablet (10 mg) by mouth.      DULoxetine (Cymbalta) 60 mg DR capsule Take 1 capsule (60 mg) by mouth once daily.      Eliquis 5 mg tablet Take 1 tablet (5 mg) by mouth 2 times a day. (Patient taking differently: Take 0.5 tablets (2.5 mg) by mouth 2 times a day.)      ergocalciferol (Vitamin D-2) 1.25 MG (43641 UT) capsule Take 1 capsule (1.25 mg) by mouth 1 (one) time per week.      losartan (Cozaar) 25 mg tablet Take 1 tablet (25 mg) by mouth once daily in the morning. (Patient taking differently: Take 2 tablets (50 mg) by mouth 2 times a day.)      metoprolol succinate XL (Toprol-XL) 25 mg 24 hr tablet Take 1 tablet (25 mg) by mouth once daily. Do not crush or chew.      metoprolol tartrate (Lopressor) 25 mg tablet Take 1 tablet (25 mg) by mouth 2 times a day.      montelukast (Singulair) 10 mg tablet Take 1 tablet (10 mg) by mouth once daily.      ondansetron ODT (Zofran-ODT) 4 mg disintegrating tablet Dissolve 1 tablet (4 mg) in the mouth every 8 hours if needed.      Ozempic 0.25 mg or 0.5 mg (2 mg/3 mL) pen injector 0.25 mg 1 (one) time per week. (Patient taking differently: 0.5 mg 1 (one) time per week.)      traMADol (Ultram) 50 mg tablet Take 1 tablet (50 mg) by mouth 2 times a day.       No current facility-administered medications for this visit.

## 2025-06-27 ENCOUNTER — ANESTHESIA (OUTPATIENT)
Dept: OPERATING ROOM | Facility: HOSPITAL | Age: 75
End: 2025-06-27
Payer: MEDICARE

## 2025-06-27 ENCOUNTER — HOSPITAL ENCOUNTER (OUTPATIENT)
Dept: OPERATING ROOM | Facility: HOSPITAL | Age: 75
Discharge: HOME | End: 2025-06-27
Payer: MEDICARE

## 2025-06-27 VITALS
HEART RATE: 83 BPM | DIASTOLIC BLOOD PRESSURE: 62 MMHG | SYSTOLIC BLOOD PRESSURE: 119 MMHG | HEIGHT: 66 IN | OXYGEN SATURATION: 90 % | BODY MASS INDEX: 41.46 KG/M2 | TEMPERATURE: 97.4 F | WEIGHT: 258 LBS | RESPIRATION RATE: 14 BRPM

## 2025-06-27 DIAGNOSIS — Z12.11 ENCOUNTER FOR SCREENING FOR MALIGNANT NEOPLASM OF COLON: ICD-10-CM

## 2025-06-27 LAB — GLUCOSE BLD MANUAL STRIP-MCNC: 123 MG/DL (ref 74–99)

## 2025-06-27 PROCEDURE — 3700000001 HC GENERAL ANESTHESIA TIME - INITIAL BASE CHARGE: Performed by: NURSE ANESTHETIST, CERTIFIED REGISTERED

## 2025-06-27 PROCEDURE — 2500000004 HC RX 250 GENERAL PHARMACY W/ HCPCS (ALT 636 FOR OP/ED): Performed by: ANESTHESIOLOGY

## 2025-06-27 PROCEDURE — 2500000001 HC RX 250 WO HCPCS SELF ADMINISTERED DRUGS (ALT 637 FOR MEDICARE OP): Performed by: ANESTHESIOLOGY

## 2025-06-27 PROCEDURE — 3600000002 HC OR TIME - INITIAL BASE CHARGE - PROCEDURE LEVEL TWO: Performed by: NURSE ANESTHETIST, CERTIFIED REGISTERED

## 2025-06-27 PROCEDURE — 7100000001 HC RECOVERY ROOM TIME - INITIAL BASE CHARGE: Performed by: NURSE ANESTHETIST, CERTIFIED REGISTERED

## 2025-06-27 PROCEDURE — 3700000002 HC GENERAL ANESTHESIA TIME - EACH INCREMENTAL 1 MINUTE: Performed by: NURSE ANESTHETIST, CERTIFIED REGISTERED

## 2025-06-27 PROCEDURE — 7100000002 HC RECOVERY ROOM TIME - EACH INCREMENTAL 1 MINUTE: Performed by: NURSE ANESTHETIST, CERTIFIED REGISTERED

## 2025-06-27 PROCEDURE — G0121 COLON CA SCRN NOT HI RSK IND: HCPCS | Performed by: INTERNAL MEDICINE

## 2025-06-27 PROCEDURE — 3600000007 HC OR TIME - EACH INCREMENTAL 1 MINUTE - PROCEDURE LEVEL TWO: Performed by: NURSE ANESTHETIST, CERTIFIED REGISTERED

## 2025-06-27 PROCEDURE — 82947 ASSAY GLUCOSE BLOOD QUANT: CPT

## 2025-06-27 PROCEDURE — 7100000010 HC PHASE TWO TIME - EACH INCREMENTAL 1 MINUTE: Performed by: NURSE ANESTHETIST, CERTIFIED REGISTERED

## 2025-06-27 PROCEDURE — 2500000004 HC RX 250 GENERAL PHARMACY W/ HCPCS (ALT 636 FOR OP/ED): Performed by: NURSE ANESTHETIST, CERTIFIED REGISTERED

## 2025-06-27 PROCEDURE — 7100000009 HC PHASE TWO TIME - INITIAL BASE CHARGE: Performed by: NURSE ANESTHETIST, CERTIFIED REGISTERED

## 2025-06-27 PROCEDURE — 2500000005 HC RX 250 GENERAL PHARMACY W/O HCPCS: Performed by: ANESTHESIOLOGY

## 2025-06-27 RX ORDER — SODIUM CITRATE AND CITRIC ACID MONOHYDRATE 334; 500 MG/5ML; MG/5ML
30 SOLUTION ORAL ONCE
Status: COMPLETED | OUTPATIENT
Start: 2025-06-27 | End: 2025-06-27

## 2025-06-27 RX ORDER — METOCLOPRAMIDE HYDROCHLORIDE 5 MG/ML
10 INJECTION INTRAMUSCULAR; INTRAVENOUS ONCE
Status: COMPLETED | OUTPATIENT
Start: 2025-06-27 | End: 2025-06-27

## 2025-06-27 RX ORDER — SODIUM CHLORIDE 9 MG/ML
125 INJECTION, SOLUTION INTRAVENOUS CONTINUOUS
Status: DISCONTINUED | OUTPATIENT
Start: 2025-06-27 | End: 2025-06-28 | Stop reason: HOSPADM

## 2025-06-27 RX ORDER — NITROGLYCERIN 20 MG/G
0.5 OINTMENT TOPICAL ONCE
Status: DISCONTINUED | OUTPATIENT
Start: 2025-06-27 | End: 2025-06-27 | Stop reason: HOSPADM

## 2025-06-27 RX ORDER — PROPOFOL 10 MG/ML
INJECTION, EMULSION INTRAVENOUS AS NEEDED
Status: DISCONTINUED | OUTPATIENT
Start: 2025-06-27 | End: 2025-06-27

## 2025-06-27 RX ORDER — FAMOTIDINE 10 MG/ML
20 INJECTION, SOLUTION INTRAVENOUS ONCE
Status: COMPLETED | OUTPATIENT
Start: 2025-06-27 | End: 2025-06-27

## 2025-06-27 RX ADMIN — SODIUM CITRATE AND CITRIC ACID MONOHYDRATE 30 ML: 500; 334 SOLUTION ORAL at 08:02

## 2025-06-27 RX ADMIN — SODIUM CHLORIDE: 9 INJECTION, SOLUTION INTRAVENOUS at 08:24

## 2025-06-27 RX ADMIN — Medication 2 L/MIN: at 08:02

## 2025-06-27 RX ADMIN — FAMOTIDINE 20 MG: 10 INJECTION, SOLUTION INTRAVENOUS at 08:02

## 2025-06-27 RX ADMIN — PROPOFOL 280 MG: 10 INJECTION, EMULSION INTRAVENOUS at 08:33

## 2025-06-27 RX ADMIN — METOCLOPRAMIDE 10 MG: 5 INJECTION, SOLUTION INTRAMUSCULAR; INTRAVENOUS at 08:02

## 2025-06-27 SDOH — HEALTH STABILITY: MENTAL HEALTH: CURRENT SMOKER: 0

## 2025-06-27 ASSESSMENT — COLUMBIA-SUICIDE SEVERITY RATING SCALE - C-SSRS
2. HAVE YOU ACTUALLY HAD ANY THOUGHTS OF KILLING YOURSELF?: NO
1. IN THE PAST MONTH, HAVE YOU WISHED YOU WERE DEAD OR WISHED YOU COULD GO TO SLEEP AND NOT WAKE UP?: NO
6. HAVE YOU EVER DONE ANYTHING, STARTED TO DO ANYTHING, OR PREPARED TO DO ANYTHING TO END YOUR LIFE?: NO

## 2025-06-27 ASSESSMENT — PAIN - FUNCTIONAL ASSESSMENT
PAIN_FUNCTIONAL_ASSESSMENT: 0-10
PAIN_FUNCTIONAL_ASSESSMENT: 0-10
PAIN_FUNCTIONAL_ASSESSMENT: WONG-BAKER FACES

## 2025-06-27 ASSESSMENT — PAIN SCALES - GENERAL
PAIN_LEVEL: 0
PAINLEVEL_OUTOF10: 10 - WORST POSSIBLE PAIN
PAINLEVEL_OUTOF10: 0 - NO PAIN
PAINLEVEL_OUTOF10: 0 - NO PAIN

## 2025-06-27 NOTE — H&P
Pre-sedation Evaluation:  Sedation Necessary For: Analgesia  Sedation to be Managed By: Anesthesia (Monitored Anesthesia Care/MAC)    History of Present Illness and Indication for Procedure      Mary Dao is a 75 y.o. female with a history of PE, diastolic dysfunction, HTN, HLD, depression/anxiety, OA, back pain, obesity (BMI 42), asthma, and cervical cord compression who presents for screening colonoscopy.      She says that her last colonoscopy was about 10-12 years ago and that was normal. She denies any significant GI symptoms except for some intermittent loose stool since her gallbladder was removed. She has not had any difficulties with sedation/anesthesia in the past. Her paternal grandfather had colon cancer, but she otherwise denies any family history of GI disease.       she has taken Eliquis (Apixaban) with the last dose 3 days prior to the procedure.      NPO guidelines met: Yes         Review of Systems  Constitutional:  Negative for chills, fever and unexpected weight change.   HENT:  Negative for trouble swallowing.    Respiratory:  Negative for shortness of breath.    Cardiovascular:  Negative for chest pain.   Gastrointestinal:  As above.   Skin:  Negative for color change.       I performed a complete 10 point review of systems and it is negative except as noted in HPI or above. All other systems have been reviewed and are negative.      Problem List[1]    Past Medical History:  She has a past medical history of Age-related nuclear cataract of right eye (08/05/2019), DM (diabetes mellitus) (Multi), DVT (deep venous thrombosis) (Multi), HTN (hypertension), Other specified anxiety disorders (07/13/2018), Personal history of other diseases of the circulatory system (11/10/2017), Personal history of other diseases of the respiratory system (11/10/2017), Personal history of other specified conditions, Unspecified asthma, uncomplicated ([facility]) (12/11/2019), and Urinary incontinence  "(05/12/2023).    She has no past medical history of Personal history of irradiation.    Past Surgical History:  She has a past surgical history that includes Spine surgery; Tonsillectomy; Cholecystectomy (N/A); and Anterior cruciate ligament repair.      Social History:  She reports that she has never smoked. She has never used smokeless tobacco. She reports that she does not drink alcohol and does not use drugs.    Family History:  Family History[2]     Allergies:  Coffee (coffea arabica), Codeine, Dextroamphetamine, Dextromethorphan, Fluticasone propion-salmeterol, Lidocaine, Morphine, Other, Penicillins, and Sulfa (sulfonamide antibiotics)    Current Medications  Medications Ordered Prior to Encounter[3]      Last Recorded Vitals  Blood pressure 135/75, pulse 96, temperature 36.6 °C (97.8 °F), temperature source Temporal, resp. rate 20, height 1.676 m (5' 6\"), weight 117 kg (258 lb), SpO2 96%.      Physical Exam  Vitals reviewed.   Constitutional:       General: She is not in acute distress.     Appearance: She is obese. She is not ill-appearing.   HENT:      Mouth/Throat:      Comments: Mallampati: I  Cardiovascular:      Rate and Rhythm: Normal rate and regular rhythm.      Heart sounds: Normal heart sounds. No murmur heard.  Pulmonary:      Effort: Pulmonary effort is normal. No respiratory distress.      Breath sounds: Normal breath sounds. No wheezing.   Abdominal:      General: Bowel sounds are normal.      Palpations: Abdomen is soft.      Tenderness: There is no abdominal tenderness.   Skin:     General: Skin is warm and dry.   Neurological:      General: No focal deficit present.      Mental Status: She is alert and oriented to person, place, and time.   Psychiatric:         Mood and Affect: Mood normal.         Behavior: Behavior normal.         Thought Content: Thought content normal.         Judgment: Judgment normal.              Assessment/Plan     Colonoscopy in OR with MAC sedation, ASA " 3        Level of Sedation: Moderate Sedation  (Sedation medications to be delivered via monitored anesthesia care (MAC).     This evaluation serves as my H&P.     Outpatient medication list and allergies have been reviewed.  Pre-procedure/julieta procedure antibiotics not needed.     Pre-procedure evaluation completed by physician.           Michael Noguera MD                 [1]   Patient Active Problem List  Diagnosis    Personal history of other venous thrombosis and embolism    Peripheral neuropathy    Low back pain    Knee instability, right    Inappropriate sinus node tachycardia    Hyperlipidemia    Essential hypertension    Diastolic dysfunction without heart failure    Dermatographism    Depression with anxiety    Asthma    Morbid obesity (Multi)    Primary osteoarthritis involving multiple joints    Sensorineural hearing loss, bilateral    Chronic pain syndrome    Controlled type 2 diabetes mellitus without complication, without long-term current use of insulin    Shortness of breath    Osteoarthritis    Arthralgia of hip   [2]   Family History  Problem Relation Name Age of Onset    Diabetes Mother      Heart disease Mother      Aneurysm Father      Diabetes Maternal Grandmother      Stroke Maternal Grandmother      Cancer Paternal Grandmother      Colon cancer Paternal Grandfather      Diabetes Mother's Sister     [3]   Current Outpatient Medications on File Prior to Encounter   Medication Sig Dispense Refill    albuterol 2.5 mg /3 mL (0.083 %) nebulizer solution Take 3 mL (2.5 mg) by nebulization every 6 hours if needed.      albuterol 90 mcg/actuation inhaler Inhale 2 puffs every 4 hours if needed.      baclofen (Lioresal) 10 mg tablet Take 1 tablet (10 mg) by mouth once daily.      benzonatate (Tessalon) 100 mg capsule Take 1 capsule (100 mg) by mouth 3 times a day as needed for cough. Do not crush or chew.      budesonide-formoteroL (Symbicort) 160-4.5 mcg/actuation inhaler Inhale 2 puffs 2 times a  day. Rinse mouth with water after use to reduce aftertaste and incidence of candidiasis. Do not swallow.      cetirizine (ZyrTEC) 10 mg tablet Take 1 tablet (10 mg) by mouth.      DULoxetine (Cymbalta) 60 mg DR capsule Take 1 capsule (60 mg) by mouth once daily.      Eliquis 5 mg tablet Take 1 tablet (5 mg) by mouth 2 times a day. (Patient taking differently: Take 0.5 tablets (2.5 mg) by mouth 2 times a day.)      ergocalciferol (Vitamin D-2) 1.25 MG (92914 UT) capsule Take 1 capsule (1.25 mg) by mouth 1 (one) time per week.      losartan (Cozaar) 25 mg tablet Take 1 tablet (25 mg) by mouth once daily in the morning. (Patient taking differently: Take 2 tablets (50 mg) by mouth 2 times a day.)      metoprolol succinate XL (Toprol-XL) 25 mg 24 hr tablet Take 1 tablet (25 mg) by mouth once daily. Do not crush or chew.      montelukast (Singulair) 10 mg tablet Take 1 tablet (10 mg) by mouth once daily.      Ozempic 0.25 mg or 0.5 mg (2 mg/3 mL) pen injector 0.25 mg 1 (one) time per week. (Patient taking differently: 0.5 mg 1 (one) time per week.)      metoprolol tartrate (Lopressor) 25 mg tablet Take 1 tablet (25 mg) by mouth 2 times a day. (Patient not taking: Reported on 6/27/2025)      ondansetron ODT (Zofran-ODT) 4 mg disintegrating tablet Dissolve 1 tablet (4 mg) in the mouth every 8 hours if needed.      traMADol (Ultram) 50 mg tablet Take 1 tablet (50 mg) by mouth 2 times a day. (Patient not taking: Reported on 6/27/2025)       No current facility-administered medications on file prior to encounter.

## 2025-06-27 NOTE — ANESTHESIA PREPROCEDURE EVALUATION
Patient: Mary Dao    Procedure Information       Date/Time: 06/27/25 0830    Scheduled providers: Michael Noguera MD    Procedure: COLONOSCOPY    Location: [facility] OR            Relevant Problems   Cardiac   (+) Essential hypertension   (+) Hyperlipidemia   (+) Inappropriate sinus node tachycardia      Pulmonary   (+) Asthma      Neuro   (+) Depression with anxiety   (+) Peripheral neuropathy      Endocrine   (+) Controlled type 2 diabetes mellitus without complication, without long-term current use of insulin   (+) Morbid obesity (Multi)      Musculoskeletal   (+) Chronic pain syndrome   (+) Osteoarthritis   (+) Primary osteoarthritis involving multiple joints      HEENT   (+) Sensorineural hearing loss, bilateral       Clinical information reviewed:   Tobacco  Allergies  Meds   Med Hx  Surg Hx  OB Status  Fam Hx  Soc   Hx        NPO Detail:  NPO/Void Status  Date of Last Liquid: 06/27/25  Time of Last Liquid: 0245  Date of Last Solid: 06/25/25  Time of Last Solid: 1800  Last Intake Type: Clear fluids         Physical Exam    Airway  Mallampati: I     Cardiovascular - normal exam   Dental    Pulmonary    Abdominal            Anesthesia Plan    History of general anesthesia?: yes  History of complications of general anesthesia?: no    ASA 3     MAC     The patient is not a current smoker.    Anesthetic plan and risks discussed with patient.  Use of blood products discussed with who consented to blood products.

## 2025-06-27 NOTE — ANESTHESIA POSTPROCEDURE EVALUATION
Patient: Mary Dao    Procedure Summary       Date: 06/27/25 Room / Location: [facility] OR    Anesthesia Start: 0824 Anesthesia Stop: 0854    Procedure: COLONOSCOPY Diagnosis: Encounter for screening for malignant neoplasm of colon    Scheduled Providers: Michael Noguera MD Responsible Provider: GREG Gaytan    Anesthesia Type: MAC ASA Status: 3            Anesthesia Type: MAC    Vitals Value Taken Time   /62 06/27/25 09:00   Temp 36.3 °C (97.4 °F) 06/27/25 08:53   Pulse 79 06/27/25 09:00   Resp 17 06/27/25 09:00   SpO2 100 % 06/27/25 09:00       Anesthesia Post Evaluation    Patient location during evaluation: PACU  Patient participation: complete - patient participated  Level of consciousness: awake and alert  Pain score: 0  Pain management: adequate  Airway patency: patent  Cardiovascular status: hemodynamically stable  Respiratory status: room air  Hydration status: acceptable  Postoperative Nausea and Vomiting: none        There were no known notable events for this encounter.

## 2025-07-09 ENCOUNTER — HOSPITAL ENCOUNTER (OUTPATIENT)
Dept: RADIOLOGY | Facility: EXTERNAL LOCATION | Age: 75
Discharge: HOME | End: 2025-07-09

## 2025-07-09 ENCOUNTER — APPOINTMENT (OUTPATIENT)
Dept: ORTHOPEDIC SURGERY | Age: 75
End: 2025-07-09
Payer: MEDICARE

## 2025-07-09 VITALS — BODY MASS INDEX: 41.46 KG/M2 | HEIGHT: 66 IN | WEIGHT: 258 LBS

## 2025-07-09 DIAGNOSIS — M16.12 PRIMARY OSTEOARTHRITIS OF LEFT HIP: Primary | ICD-10-CM

## 2025-07-09 PROCEDURE — 3008F BODY MASS INDEX DOCD: CPT | Performed by: EMERGENCY MEDICINE

## 2025-07-09 PROCEDURE — 4010F ACE/ARB THERAPY RXD/TAKEN: CPT | Performed by: EMERGENCY MEDICINE

## 2025-07-09 PROCEDURE — 1159F MED LIST DOCD IN RCRD: CPT | Performed by: EMERGENCY MEDICINE

## 2025-07-09 PROCEDURE — 99204 OFFICE O/P NEW MOD 45 MIN: CPT | Performed by: EMERGENCY MEDICINE

## 2025-07-09 PROCEDURE — 20611 DRAIN/INJ JOINT/BURSA W/US: CPT | Performed by: EMERGENCY MEDICINE

## 2025-07-09 PROCEDURE — 1036F TOBACCO NON-USER: CPT | Performed by: EMERGENCY MEDICINE

## 2025-07-09 RX ORDER — LIDOCAINE HYDROCHLORIDE 10 MG/ML
4 INJECTION, SOLUTION INFILTRATION; PERINEURAL
Status: COMPLETED | OUTPATIENT
Start: 2025-07-09 | End: 2025-07-09

## 2025-07-09 RX ORDER — METHYLPREDNISOLONE ACETATE 40 MG/ML
80 INJECTION, SUSPENSION INTRA-ARTICULAR; INTRALESIONAL; INTRAMUSCULAR; SOFT TISSUE
Status: COMPLETED | OUTPATIENT
Start: 2025-07-09 | End: 2025-07-09

## 2025-07-09 RX ADMIN — LIDOCAINE HYDROCHLORIDE 4 ML: 10 INJECTION, SOLUTION INFILTRATION; PERINEURAL at 14:24

## 2025-07-09 RX ADMIN — METHYLPREDNISOLONE ACETATE 80 MG: 40 INJECTION, SUSPENSION INTRA-ARTICULAR; INTRALESIONAL; INTRAMUSCULAR; SOFT TISSUE at 14:24

## 2025-07-09 ASSESSMENT — PAIN - FUNCTIONAL ASSESSMENT: PAIN_FUNCTIONAL_ASSESSMENT: NO/DENIES PAIN

## 2025-07-09 NOTE — PROGRESS NOTES
Subjective:  Chief Complaint: Pain of the Left Hip       Patient ID: Mary Dao is a 75 y.o. coming in with left hip pain. Patient states pain has been progressive for over ten years and has recently increased. They do not report any history of trauma to the area(s) They deny having any numbness and tingling in their lower extremities. Does have lower back pain.   XR done 6/24/2025.  Diabetic: Yes   Neuropathy: Yes, UGO feet and lower legs  PREVIOUS TREATMENTS: None  Analgesics tramadol and tylenol    Last Surgery: No surgery found   Last Surgery Date: No surgery found     Mary is a very pleasant 75-year-old female who is coming in with acute on chronic left hip pain.  She was recently seen by orthopedic surgery and was referred here by Dr. Mitchell until she can get her BMI less than 40, which would make her a candidate for a total left hip replacement.  She has left anterior groin hip pain that is worse with activity and better with rest.  Her pain is relatively well-controlled while she is sitting.  She has a difficult time laying on her back and fully extending the left hip.  She denies any recent traumatic events or known injuries.  No infectious symptoms.  No other complaints today.  She is with her daughter who is helping provide additional history.         Current Medications[1]     RX Allergies[2]     Objective:   Right Hip Exam   Right hip exam is normal.       Left Hip Exam     Tenderness   The patient is experiencing no tenderness.     Range of Motion   Abduction:  abnormal   Adduction:  abnormal   Extension:  abnormal Left hip extension: Extremely painful with testing.  Flexion:  abnormal   External rotation:  abnormal   Internal rotation: abnormal     Muscle Strength   The patient has normal left hip strength.     Tests   JENNIFER: positive    Other   Sensation: normal             Imaging Results:   X-rays of the left hip and pelvis were reviewed and interpreted by me on 7/9/2025.  Patient has severe  bone-on-bone arthritis of the left hip joint.  No evidence of acute injuries or fractures.    L Inj/Asp: L hip joint on 7/9/2025 2:24 PM  Indications: pain  Details: 20 G needle, ultrasound-guided anterior approach  Medications: 80 mg methylPREDNISolone acetate 40 mg/mL; 4 mL lidocaine 10 mg/mL (1 %)  Outcome: tolerated well, no immediate complications  Procedure, treatment alternatives, risks and benefits explained, specific risks discussed. Consent was given by the patient. Immediately prior to procedure a time out was called to verify the correct patient, procedure, equipment, support staff and site/side marked as required. Patient was prepped and draped in the usual sterile fashion.            Assessment/Plan:  Referring Provider: No ref. provider found    Encounter Diagnoses:   Primary osteoarthritis of left hip     Orders Placed This Encounter    L Inj/Asp    Point of Care Ultrasound      No follow-ups on file.     We discussed the patient's treatment options at length and eventually decided to perform a left hip joint cortisone injection under ultrasound guidance here today in the office.  The patient tolerated the procedure well without any complications and activity modifications were reviewed. They will begin using prescription dose Tylenol at 1000 mg three times daily and  The patient will follow-up with me as needed depending on how they respond to this injection.     ** Please excuse any errors in grammar or translation related to this dictation. Voice recognition software was utilized to prepare this document. **         Ruperto Ibrahim MD  [facility]           [1]   Current Outpatient Medications   Medication Sig Dispense Refill    albuterol 2.5 mg /3 mL (0.083 %) nebulizer solution Take 3 mL (2.5 mg) by nebulization every 6 hours if needed.      albuterol 90 mcg/actuation inhaler Inhale 2 puffs every 4 hours if needed.      baclofen (Lioresal) 10 mg tablet Take 1 tablet  "(10 mg) by mouth once daily.      benzonatate (Tessalon) 100 mg capsule Take 1 capsule (100 mg) by mouth 3 times a day as needed for cough. Do not crush or chew.      budesonide-formoteroL (Symbicort) 160-4.5 mcg/actuation inhaler Inhale 2 puffs 2 times a day. Rinse mouth with water after use to reduce aftertaste and incidence of candidiasis. Do not swallow.      cetirizine (ZyrTEC) 10 mg tablet Take 1 tablet (10 mg) by mouth.      DULoxetine (Cymbalta) 60 mg DR capsule Take 1 capsule (60 mg) by mouth once daily.      Eliquis 5 mg tablet Take 1 tablet (5 mg) by mouth 2 times a day.      ergocalciferol (Vitamin D-2) 1.25 MG (11764 UT) capsule Take 1 capsule (1.25 mg) by mouth 1 (one) time per week.      losartan (Cozaar) 25 mg tablet Take 1 tablet (25 mg) by mouth once daily in the morning. (Patient taking differently: Take 2 tablets (50 mg) by mouth 2 times a day.)      metoprolol succinate XL (Toprol-XL) 25 mg 24 hr tablet Take 1 tablet (25 mg) by mouth once daily. Do not crush or chew.      metoprolol tartrate (Lopressor) 25 mg tablet Take 1 tablet (25 mg) by mouth 2 times a day. (Patient not taking: Reported on 6/27/2025)      montelukast (Singulair) 10 mg tablet Take 1 tablet (10 mg) by mouth once daily.      ondansetron ODT (Zofran-ODT) 4 mg disintegrating tablet Dissolve 1 tablet (4 mg) in the mouth every 8 hours if needed.      Ozempic 0.25 mg or 0.5 mg (2 mg/3 mL) pen injector 0.25 mg 1 (one) time per week. (Patient taking differently: 0.5 mg 1 (one) time per week.)      traMADol (Ultram) 50 mg tablet Take 1 tablet (50 mg) by mouth 2 times a day. (Patient not taking: Reported on 6/27/2025)       No current facility-administered medications for this visit.   [2]   Allergies  Allergen Reactions    Coffee (Coffea Arabica) Cough    Codeine Unknown     \"Intercostal muscle contractions.\"    Dextroamphetamine Unknown    Dextromethorphan Other     \"intetecostal muscle contractions.\"    Fluticasone Propion-Salmeterol " "Unknown    Lidocaine Unknown    Morphine Unknown     \"intetecostal muscle contractions.\"    Other Unknown     cough, sneeze and chest tightness    Penicillins Unknown and Rash     Increased redness over body, itching.    Sulfa (Sulfonamide Antibiotics) Rash     "

## 2025-08-26 ENCOUNTER — PREP FOR PROCEDURE (OUTPATIENT)
Dept: ORTHOPEDIC SURGERY | Facility: HOSPITAL | Age: 75
End: 2025-08-26

## 2025-08-26 ENCOUNTER — APPOINTMENT (OUTPATIENT)
Dept: ORTHOPEDIC SURGERY | Facility: CLINIC | Age: 75
End: 2025-08-26
Payer: MEDICARE

## 2025-08-26 VITALS — WEIGHT: 253 LBS | HEIGHT: 66 IN | BODY MASS INDEX: 40.66 KG/M2

## 2025-08-26 DIAGNOSIS — M16.12 PRIMARY OSTEOARTHRITIS OF LEFT HIP: ICD-10-CM

## 2025-08-26 DIAGNOSIS — M16.12 PRIMARY OSTEOARTHRITIS OF LEFT HIP: Primary | ICD-10-CM

## 2025-08-26 PROCEDURE — 1036F TOBACCO NON-USER: CPT | Performed by: ORTHOPAEDIC SURGERY

## 2025-08-26 PROCEDURE — 1159F MED LIST DOCD IN RCRD: CPT | Performed by: ORTHOPAEDIC SURGERY

## 2025-08-26 PROCEDURE — 99215 OFFICE O/P EST HI 40 MIN: CPT | Performed by: ORTHOPAEDIC SURGERY

## 2025-08-26 PROCEDURE — G2211 COMPLEX E/M VISIT ADD ON: HCPCS | Performed by: ORTHOPAEDIC SURGERY

## 2025-08-26 PROCEDURE — 3008F BODY MASS INDEX DOCD: CPT | Performed by: ORTHOPAEDIC SURGERY

## 2025-08-26 PROCEDURE — 4010F ACE/ARB THERAPY RXD/TAKEN: CPT | Performed by: ORTHOPAEDIC SURGERY

## 2025-08-26 ASSESSMENT — PAIN - FUNCTIONAL ASSESSMENT: PAIN_FUNCTIONAL_ASSESSMENT: NO/DENIES PAIN

## 2025-09-04 ENCOUNTER — TELEPHONE (OUTPATIENT)
Dept: CARDIOLOGY | Facility: HOSPITAL | Age: 75
End: 2025-09-04
Payer: MEDICARE

## 2025-11-04 ENCOUNTER — APPOINTMENT (OUTPATIENT)
Dept: ORTHOPEDIC SURGERY | Facility: CLINIC | Age: 75
End: 2025-11-04
Payer: MEDICARE